# Patient Record
Sex: FEMALE | Race: OTHER | Employment: PART TIME | URBAN - METROPOLITAN AREA
[De-identification: names, ages, dates, MRNs, and addresses within clinical notes are randomized per-mention and may not be internally consistent; named-entity substitution may affect disease eponyms.]

---

## 2017-01-12 ENCOUNTER — OFFICE VISIT (OUTPATIENT)
Dept: NEUROLOGY | Facility: CLINIC | Age: 27
End: 2017-01-12

## 2017-01-12 DIAGNOSIS — Z01.30 BLOOD PRESSURE CHECK: Primary | ICD-10-CM

## 2017-01-26 ENCOUNTER — TELEPHONE (OUTPATIENT)
Dept: NEUROLOGY | Facility: CLINIC | Age: 27
End: 2017-01-26

## 2017-01-26 ENCOUNTER — OFFICE VISIT (OUTPATIENT)
Dept: NEUROLOGY | Facility: CLINIC | Age: 27
End: 2017-01-26

## 2017-01-26 VITALS
WEIGHT: 164.5 LBS | RESPIRATION RATE: 16 BRPM | BODY MASS INDEX: 30.66 KG/M2 | HEART RATE: 99 BPM | HEIGHT: 61.5 IN | SYSTOLIC BLOOD PRESSURE: 125 MMHG | DIASTOLIC BLOOD PRESSURE: 83 MMHG

## 2017-01-26 DIAGNOSIS — R51.9 CHRONIC DAILY HEADACHE: Primary | ICD-10-CM

## 2017-01-26 DIAGNOSIS — G43.019 INTRACTABLE MIGRAINE WITHOUT AURA AND WITHOUT STATUS MIGRAINOSUS: ICD-10-CM

## 2017-01-26 DIAGNOSIS — Z3A.19 19 WEEKS GESTATION OF PREGNANCY: ICD-10-CM

## 2017-01-26 PROCEDURE — 99244 OFF/OP CNSLTJ NEW/EST MOD 40: CPT | Performed by: OTHER

## 2017-01-26 RX ORDER — PROPRANOLOL HYDROCHLORIDE 40 MG/1
40 TABLET ORAL 2 TIMES DAILY
Qty: 60 TABLET | Refills: 0 | Status: SHIPPED | OUTPATIENT
Start: 2017-01-26 | End: 2017-02-22

## 2017-01-26 RX ORDER — SUMATRIPTAN 50 MG/1
50 TABLET, FILM COATED ORAL EVERY 2 HOUR PRN
Qty: 9 TABLET | Refills: 0 | Status: SHIPPED | OUTPATIENT
Start: 2017-01-26 | End: 2017-02-25

## 2017-01-26 RX ORDER — VERAPAMIL HYDROCHLORIDE 40 MG/1
40 TABLET ORAL 3 TIMES DAILY
Qty: 90 TABLET | Refills: 0 | Status: SHIPPED | OUTPATIENT
Start: 2017-01-26 | End: 2017-01-26

## 2017-01-26 NOTE — TELEPHONE ENCOUNTER
OB was agreeable to the use of propanolol. Patient would like prescription sent today if possible.   Use the [facility] on file

## 2017-01-26 NOTE — H&P
[facility] New Patient / Consult Visit    Alexia Vieyra is a 27year old female.                          Referring MD: Ramsey Penn    Patient presents with:  Migraine: C/O of getting between 4-8 migraines a month      HPI:    Wayne Tavarez improvement and weaned herself off this medication 1-2 weeks ago. .      In remote past, she tried Treximet 1 time when she was at work (when not pregnant) and she had an episode where she had severe headaches, with hot sensation and noted worsening \"hot s Paternal Grandmother    • Kidney Disease Paternal Grandmother    • Diabetes Maternal Grandmother    • Hypertension Maternal Grandmother    • Depression Maternal Grandmother    • lower back pain [Other] [OTHER] Maternal Grandmother        Allergies:  No Kno normal accomodation   Visual acuity: Normal              Visual fields: Normal  Oculomotor/Trochlear/Abducens:    Eye Movements: EOMI without nystagmus  Trigeminal:   Facial sensation:intact to light touch bilaterally  Facial:   Smile symmetric, eyebrow ra options are limited, as patient is 23 weeks gestational age. I suspect there is some hormonal component, contributing to the worsening of her recent headaches.   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       Suezanne Fothergill, MD, Neurology  [facility]  Pager [phone]  1/26/2017

## 2017-01-26 NOTE — TELEPHONE ENCOUNTER
Per Dr. Baltazar Mario: Can we try propanolol, low dose? Patient informed and will check with her OB.

## 2017-01-26 NOTE — TELEPHONE ENCOUNTER
Unknown Repine checked with her OB and reports that they, along with her Maternal Fetal Medicine physicians, are concerned that her BP may run too low and do not wish for her to initiate therapy.     She wants to alert Dr. Muna Sandoval and request any other recommendatio

## 2017-01-26 NOTE — TELEPHONE ENCOUNTER
Rx sent propranolol 40 mg bid; disp 60 - will assess tolerance before ordering refills; discontinued verapamil in system

## 2017-01-26 NOTE — PATIENT INSTRUCTIONS
Discuss with OB a trial of verapamil 40 mg tid  For prevention    take sumatriptan 50 mg (Imitrex) within first 30 minutes of symptoms starting; if not improved after 2 hours, take additional dose, and then stop taking; monitor for chest pain / tightness; insurer. Depending on your insurance carrier, approval may take 3-10 days. It is highly recommended patients contact their insurance carrier directly to determine coverage.   If test is done without insurance authorization, patient may be responsible for t

## 2017-02-22 DIAGNOSIS — R51.9 CHRONIC DAILY HEADACHE: Primary | ICD-10-CM

## 2017-02-22 DIAGNOSIS — G43.019 INTRACTABLE MIGRAINE WITHOUT AURA AND WITHOUT STATUS MIGRAINOSUS: ICD-10-CM

## 2017-02-22 RX ORDER — PROPRANOLOL HYDROCHLORIDE 40 MG/1
40 TABLET ORAL 2 TIMES DAILY
Qty: 60 TABLET | Refills: 2 | Status: SHIPPED | OUTPATIENT
Start: 2017-02-22 | End: 2017-03-17

## 2017-02-22 NOTE — TELEPHONE ENCOUNTER
Received refill request for Propranolol 40 mg twice daily.   Per TE encounter:    Rx sent propranolol 40 mg bid; disp 60 - will assess tolerance before ordering refills; discontinued verapamil in system                Called and left message for patient reg

## 2017-02-22 NOTE — TELEPHONE ENCOUNTER
Medication: Propranolol     Date of last refill: 1/26/2017  [place]    Last office visit: 1/26/2017  Due back to clinic per last office note:  RTC in 3 months  Date next office visit scheduled:  No

## 2017-02-22 NOTE — TELEPHONE ENCOUNTER
Patient returned phone call. Is doing well on current medication. No dizziness or side effects. Patient instructed to follow up in April. Voiced understanding.

## 2017-02-27 RX ORDER — AMITRIPTYLINE HYDROCHLORIDE 10 MG/1
TABLET, FILM COATED ORAL
Qty: 60 TABLET | Refills: 1 | OUTPATIENT
Start: 2017-02-27

## 2017-03-17 DIAGNOSIS — G43.019 INTRACTABLE MIGRAINE WITHOUT AURA AND WITHOUT STATUS MIGRAINOSUS: ICD-10-CM

## 2017-03-17 DIAGNOSIS — R51.9 CHRONIC DAILY HEADACHE: Primary | ICD-10-CM

## 2017-03-17 RX ORDER — PROPRANOLOL HYDROCHLORIDE 40 MG/1
40 TABLET ORAL 2 TIMES DAILY
Qty: 180 TABLET | Refills: 0 | Status: SHIPPED | OUTPATIENT
Start: 2017-03-17 | End: 2017-07-11

## 2017-03-17 NOTE — TELEPHONE ENCOUNTER
Medication: propranolol (90 day supply)    Date of last refill: 2/22/17 (30 day + 2 refills)  [place]    Last office visit: 1/26/17  Due back to clinic per last office note:  3 months  Date next office visit scheduled

## 2017-03-24 PROCEDURE — 82950 GLUCOSE TEST: CPT | Performed by: OBSTETRICS & GYNECOLOGY

## 2017-05-25 PROCEDURE — 87081 CULTURE SCREEN ONLY: CPT | Performed by: OBSTETRICS & GYNECOLOGY

## 2017-05-30 ENCOUNTER — HOSPITAL ENCOUNTER (INPATIENT)
Facility: HOSPITAL | Age: 27
LOS: 2 days | Discharge: HOME | End: 2017-06-01
Attending: OBSTETRICS & GYNECOLOGY | Admitting: OBSTETRICS & GYNECOLOGY
Payer: COMMERCIAL

## 2017-05-30 PROBLEM — Z34.90 PREGNANCY (HCC): Status: ACTIVE | Noted: 2017-05-30

## 2017-05-30 PROBLEM — Z34.90 PREGNANCY: Status: ACTIVE | Noted: 2017-05-30

## 2017-05-30 PROCEDURE — 86901 BLOOD TYPING SEROLOGIC RH(D): CPT | Performed by: OBSTETRICS & GYNECOLOGY

## 2017-05-30 PROCEDURE — 86850 RBC ANTIBODY SCREEN: CPT | Performed by: OBSTETRICS & GYNECOLOGY

## 2017-05-30 PROCEDURE — 86780 TREPONEMA PALLIDUM: CPT | Performed by: OBSTETRICS & GYNECOLOGY

## 2017-05-30 PROCEDURE — 85027 COMPLETE CBC AUTOMATED: CPT | Performed by: OBSTETRICS & GYNECOLOGY

## 2017-05-30 PROCEDURE — 86900 BLOOD TYPING SEROLOGIC ABO: CPT | Performed by: OBSTETRICS & GYNECOLOGY

## 2017-05-30 RX ORDER — IBUPROFEN 600 MG/1
600 TABLET ORAL ONCE AS NEEDED
Status: DISCONTINUED | OUTPATIENT
Start: 2017-05-30 | End: 2017-06-01

## 2017-05-30 RX ORDER — TERBUTALINE SULFATE 1 MG/ML
0.25 INJECTION, SOLUTION SUBCUTANEOUS AS NEEDED
Status: DISCONTINUED | OUTPATIENT
Start: 2017-05-30 | End: 2017-06-01

## 2017-05-30 RX ORDER — OXYTOCIN 10 [USP'U]/ML
INJECTION, SOLUTION INTRAMUSCULAR; INTRAVENOUS
Status: COMPLETED
Start: 2017-05-30 | End: 2017-05-30

## 2017-05-30 RX ORDER — PROPRANOLOL HYDROCHLORIDE 40 MG/1
40 TABLET ORAL
Status: DISCONTINUED | OUTPATIENT
Start: 2017-05-30 | End: 2017-06-01

## 2017-05-31 PROCEDURE — 85025 COMPLETE CBC W/AUTO DIFF WBC: CPT | Performed by: OBSTETRICS & GYNECOLOGY

## 2017-05-31 RX ORDER — IBUPROFEN 600 MG/1
600 TABLET ORAL EVERY 6 HOURS
Status: DISCONTINUED | OUTPATIENT
Start: 2017-05-31 | End: 2017-06-01

## 2017-05-31 RX ORDER — HYDROCODONE BITARTRATE AND ACETAMINOPHEN 5; 325 MG/1; MG/1
2 TABLET ORAL EVERY 4 HOURS PRN
Status: DISCONTINUED | OUTPATIENT
Start: 2017-05-31 | End: 2017-06-01

## 2017-05-31 RX ORDER — ACETAMINOPHEN 325 MG/1
650 TABLET ORAL EVERY 4 HOURS PRN
Status: DISCONTINUED | OUTPATIENT
Start: 2017-05-31 | End: 2017-06-01

## 2017-05-31 RX ORDER — ZOLPIDEM TARTRATE 5 MG/1
5 TABLET ORAL NIGHTLY PRN
Status: DISCONTINUED | OUTPATIENT
Start: 2017-05-31 | End: 2017-06-01

## 2017-05-31 RX ORDER — SIMETHICONE 80 MG
80 TABLET,CHEWABLE ORAL 3 TIMES DAILY PRN
Status: DISCONTINUED | OUTPATIENT
Start: 2017-05-31 | End: 2017-06-01

## 2017-05-31 RX ORDER — HYDROCODONE BITARTRATE AND ACETAMINOPHEN 5; 325 MG/1; MG/1
1 TABLET ORAL EVERY 4 HOURS PRN
Status: DISCONTINUED | OUTPATIENT
Start: 2017-05-31 | End: 2017-06-01

## 2017-05-31 RX ORDER — BISACODYL 10 MG
10 SUPPOSITORY, RECTAL RECTAL ONCE AS NEEDED
Status: ACTIVE | OUTPATIENT
Start: 2017-05-31 | End: 2017-05-31

## 2017-05-31 RX ORDER — DOCUSATE SODIUM 100 MG/1
100 CAPSULE, LIQUID FILLED ORAL
Status: DISCONTINUED | OUTPATIENT
Start: 2017-05-31 | End: 2017-06-01

## 2017-05-31 NOTE — PROGRESS NOTES
Pd #1  pT WITHOUT COMPLAINTS -   /74 mmHg  Pulse 61  Temp(Src) 98.7 °F (37.1 °C) (Oral)  Resp 16  Ht 5' 0.98\" (1.549 m)  Wt 184 lb 3.2 oz (83.553 kg)  BMI 34.82 kg/m2  LMP 09/15/2016  Breastfeeding?  Yes   Fundus - firm at U  Lochia - appropriate  Ex

## 2017-05-31 NOTE — PROGRESS NOTES
NURSING ADMISSION NOTE      Patient admitted via Wheelchair  Oriented to room. Safety precautions initiated. Bed in low position. Call light in reach. Pt admitted to room 1113 with spouse and infant at bedside.  ID tags verified, security bands in

## 2017-05-31 NOTE — PROGRESS NOTES
Pt is a 27year old female admitted to 114/114-A. Patient presents with:  Laboring: Patient c/o contractions every 5-7 minutes at 2030. Patient states had some spotting at 2100.  Patient SROM upon arrival to hospital.  Uncomfortable with contractions, mov

## 2017-05-31 NOTE — H&P
26 YO  with an LAZARO of 2017, 36w 5d EGA admitted due to advanced active labor. Called as Level 3 physician to attend delivery. Onset of contractions early this morning.  More regular and intense around 6 pm. SROM as pt arrived at hospital. Br Chromic with local anesthesia.  cc. IMP:  36 week + pregnancy with precipitous active phase of labor. PLAN:   Discussed potential for infant to transition in NICU due to  gestation and any respiratory, temperature, or glucose issue.  Ot

## 2017-05-31 NOTE — H&P
[address] Patient Status:  Inpatient    1990 MRN [id number]   [facility] 1NW-A Attending Reji Us DO   Hosp Day # 0 PCP Nazia Dean DO     SUBJECTIVE:    Elio Henriquez is a 27year old total) by mouth 2 (two) times daily. Disp: 180 tablet Rfl: 0 Taking   Clindamycin Phos-Benzoyl Perox 1-5 % External Gel Apply to face and chest twice daily. Disp: 50 g Rfl: 3 Taking   Prenatal Multivit-Min-Fe-FA (PRENATAL OR) Take by mouth.  Disp:  Rfl:  Ta

## 2017-05-31 NOTE — PROGRESS NOTES
Patient transferred to mother/baby via wheelchair with  in arms. Stable X 2, accompanied by spouse and belongings. Report given to Baljinder Valladares RN.

## 2017-06-01 VITALS
RESPIRATION RATE: 16 BRPM | HEIGHT: 60.98 IN | HEART RATE: 50 BPM | WEIGHT: 184.19 LBS | BODY MASS INDEX: 34.78 KG/M2 | TEMPERATURE: 98 F | DIASTOLIC BLOOD PRESSURE: 71 MMHG | SYSTOLIC BLOOD PRESSURE: 107 MMHG

## 2017-06-01 RX ORDER — HYDROCODONE BITARTRATE AND ACETAMINOPHEN 5; 325 MG/1; MG/1
1-2 TABLET ORAL EVERY 4 HOURS PRN
Qty: 30 TABLET | Refills: 0 | Status: SHIPPED | OUTPATIENT
Start: 2017-06-01 | End: 2017-07-11

## 2017-06-01 RX ORDER — IBUPROFEN 600 MG/1
600 TABLET ORAL EVERY 6 HOURS PRN
Qty: 60 TABLET | Refills: 0 | Status: SHIPPED | OUTPATIENT
Start: 2017-06-01 | End: 2017-07-11

## 2017-06-04 ENCOUNTER — TELEPHONE (OUTPATIENT)
Dept: OBGYN UNIT | Facility: HOSPITAL | Age: 27
End: 2017-06-04

## 2017-06-05 ENCOUNTER — TELEPHONE (OUTPATIENT)
Dept: OBGYN UNIT | Facility: HOSPITAL | Age: 27
End: 2017-06-05

## 2017-06-06 ENCOUNTER — NURSE ONLY (OUTPATIENT)
Dept: LACTATION | Facility: HOSPITAL | Age: 27
End: 2017-06-06
Attending: OBSTETRICS & GYNECOLOGY
Payer: COMMERCIAL

## 2017-06-06 VITALS — TEMPERATURE: 99 F

## 2017-06-06 DIAGNOSIS — O92.5 SUPPRESSED LACTATION WITH INFANT BREAST ATTACHMENT DIFFICULTY: Primary | ICD-10-CM

## 2017-06-06 DIAGNOSIS — O92.79 PAINFUL LACTATION: ICD-10-CM

## 2017-06-06 PROCEDURE — 99214 OFFICE O/P EST MOD 30 MIN: CPT

## 2017-06-06 NOTE — PATIENT INSTRUCTIONS
Kristy's naked weight is 5lbs 5.5oz. She transfers 54ml from both breasts and is satisfied. Some pinching noted despite manual flaring of lips and nipples compressed post feed.  Based on this assessment, it is recommended to continue to breastfeed on demelena doctor about taking ibuprofen for relief of swelling and discomfort. Call your [facility] doctor with any signs of mastitis (breast infection)  · Plugged area(s) are not soft within 24 hours. · Breast becomes firm, reddened, or painful.    · Fever, chills, or

## 2017-06-10 NOTE — PROGRESS NOTES
OB Progress Note PPD#2  S: Feels well. Ambulating, eating. Pain controlled. Minimal VB. Breastfeeding. O:   Blood pressure 118/72, pulse 58, temperature 98.1 °F (36.7 °C), temperature source Oral, resp.  rate 16, height 5' 0.98\" (1.549 m), weight 184 l

## 2017-06-12 ENCOUNTER — NURSE ONLY (OUTPATIENT)
Dept: LACTATION | Facility: HOSPITAL | Age: 27
End: 2017-06-12
Attending: OBSTETRICS & GYNECOLOGY
Payer: COMMERCIAL

## 2017-06-12 DIAGNOSIS — R52 PAIN AGGRAVATED BY BREAST FEEDING: Primary | ICD-10-CM

## 2017-06-12 DIAGNOSIS — O92.79 PAIN AGGRAVATED BY BREAST FEEDING: Primary | ICD-10-CM

## 2017-06-12 PROCEDURE — 99213 OFFICE O/P EST LOW 20 MIN: CPT

## 2017-06-12 NOTE — PATIENT INSTRUCTIONS
Kristy's naked weight is 5lbs 13.8oz, an increase of 8.3oz since last visit, 6 days ago. Radhajason Ahuja is still experiencing soreness with breastfeeding, left nipple abrasion has healed and nipples are intact but sore.  Kristy latches to left breast and transfers Place the shield, centered over the nipple and flip the shield right side out, drawing your nipple and areola into the shield as much as possible. • Massage breasts and if possible, hand express  some breastmilk into the nipple shield.      Latching your b to satisfaction. After feeding your baby:  • Pump your breasts for 10-15 minutes using a hospital grade rental breast pump, after most daytime feedings. This may help maintain adequate milk supply while using the shield.  If your baby is not

## 2017-07-11 PROCEDURE — 87660 TRICHOMONAS VAGIN DIR PROBE: CPT | Performed by: OBSTETRICS & GYNECOLOGY

## 2017-07-11 PROCEDURE — 87480 CANDIDA DNA DIR PROBE: CPT | Performed by: OBSTETRICS & GYNECOLOGY

## 2017-07-11 PROCEDURE — 87510 GARDNER VAG DNA DIR PROBE: CPT | Performed by: OBSTETRICS & GYNECOLOGY

## 2017-07-14 ENCOUNTER — NURSE ONLY (OUTPATIENT)
Dept: LACTATION | Facility: HOSPITAL | Age: 27
End: 2017-07-14
Attending: OBSTETRICS & GYNECOLOGY
Payer: COMMERCIAL

## 2017-07-14 DIAGNOSIS — R52 PAIN AGGRAVATED BY BREAST FEEDING: Primary | ICD-10-CM

## 2017-07-14 DIAGNOSIS — O92.79 PAIN AGGRAVATED BY BREAST FEEDING: Primary | ICD-10-CM

## 2017-07-14 PROCEDURE — 99214 OFFICE O/P EST MOD 30 MIN: CPT

## 2017-07-14 NOTE — PATIENT INSTRUCTIONS
Christophe Amaya has had a tongue tie/upper lip tie revision 2 weeks ago. Yessi Hanson now breastfeeds exclusively (bottle feeds intermittently while resting or out and about), using nipple shield mostly for pain and/or latch.  Today Ferguson latches to left breast in cross

## 2017-07-28 ENCOUNTER — APPOINTMENT (OUTPATIENT)
Dept: LAB | Age: 27
End: 2017-07-28
Attending: OBSTETRICS & GYNECOLOGY
Payer: COMMERCIAL

## 2017-07-28 LAB — HCG QUANTITATIVE: <1 MIU/ML (ref ?–1)

## 2017-07-28 PROCEDURE — 84702 CHORIONIC GONADOTROPIN TEST: CPT

## 2017-07-28 PROCEDURE — 36415 COLL VENOUS BLD VENIPUNCTURE: CPT

## 2017-08-02 ENCOUNTER — NURSE ONLY (OUTPATIENT)
Dept: LACTATION | Facility: HOSPITAL | Age: 27
End: 2017-08-02
Attending: OBSTETRICS & GYNECOLOGY
Payer: COMMERCIAL

## 2017-08-02 DIAGNOSIS — Z91.89 BREASTFEEDING PROBLEM: Primary | ICD-10-CM

## 2017-08-02 PROCEDURE — 99213 OFFICE O/P EST LOW 20 MIN: CPT

## 2017-08-14 DIAGNOSIS — R51.9 CHRONIC DAILY HEADACHE: ICD-10-CM

## 2017-08-14 DIAGNOSIS — G43.019 INTRACTABLE MIGRAINE WITHOUT AURA AND WITHOUT STATUS MIGRAINOSUS: ICD-10-CM

## 2017-08-14 RX ORDER — PROPRANOLOL HYDROCHLORIDE 40 MG/1
40 TABLET ORAL 2 TIMES DAILY
Qty: 180 TABLET | Refills: 3 | Status: SHIPPED | OUTPATIENT
Start: 2017-08-14 | End: 2018-03-29

## 2018-01-30 RX ORDER — CLINDAMYCIN AND BENZOYL PEROXIDE 10; 50 MG/G; MG/G
GEL TOPICAL
Qty: 50 G | Refills: 3 | Status: SHIPPED | OUTPATIENT
Start: 2018-01-30 | End: 2019-08-21

## 2018-01-30 NOTE — TELEPHONE ENCOUNTER
From: Gricelda Ward  Sent: 1/30/2018 3:05 PM CST  Subject: Medication Renewal Request    Gricelda Harrietgreg would like a refill of the following medications:     Clindamycin Phos-Benzoyl Perox 1-5 % External Gel [Kelsey Sanchez NP]    Preferred pharmacy

## 2018-02-16 ENCOUNTER — APPOINTMENT (OUTPATIENT)
Dept: GENERAL RADIOLOGY | Age: 28
End: 2018-02-16
Attending: EMERGENCY MEDICINE
Payer: COMMERCIAL

## 2018-02-16 ENCOUNTER — HOSPITAL ENCOUNTER (EMERGENCY)
Age: 28
Discharge: HOME | End: 2018-02-16
Attending: EMERGENCY MEDICINE
Payer: COMMERCIAL

## 2018-02-16 VITALS
RESPIRATION RATE: 18 BRPM | BODY MASS INDEX: 31 KG/M2 | TEMPERATURE: 98 F | WEIGHT: 166 LBS | HEART RATE: 85 BPM | OXYGEN SATURATION: 99 % | DIASTOLIC BLOOD PRESSURE: 68 MMHG | SYSTOLIC BLOOD PRESSURE: 128 MMHG

## 2018-02-16 DIAGNOSIS — S16.1XXA STRAIN OF NECK MUSCLE, INITIAL ENCOUNTER: ICD-10-CM
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
## 2018-02-16 DIAGNOSIS — S46.911A STRAIN OF RIGHT SHOULDER, INITIAL ENCOUNTER: ICD-10-CM

## 2018-02-16 PROCEDURE — 99284 EMERGENCY DEPT VISIT MOD MDM: CPT

## 2018-02-16 PROCEDURE — 72040 X-RAY EXAM NECK SPINE 2-3 VW: CPT | Performed by: EMERGENCY MEDICINE

## 2018-02-16 PROCEDURE — 73020 X-RAY EXAM OF SHOULDER: CPT | Performed by: EMERGENCY MEDICINE

## 2018-02-16 RX ORDER — IBUPROFEN 600 MG/1
600 TABLET ORAL ONCE
Status: COMPLETED | OUTPATIENT
Start: 2018-02-16 | End: 2018-02-16

## 2018-02-16 RX ORDER — CYCLOBENZAPRINE HCL 10 MG
10 TABLET ORAL 3 TIMES DAILY PRN
Qty: 20 TABLET | Refills: 0 | Status: SHIPPED | OUTPATIENT
Start: 2018-02-16 | End: 2018-02-23
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
## 2018-03-01 ENCOUNTER — OFFICE VISIT (OUTPATIENT)
Dept: FAMILY MEDICINE CLINIC | Facility: CLINIC | Age: 28
End: 2018-03-01

## 2018-03-01 ENCOUNTER — HOSPITAL ENCOUNTER (OUTPATIENT)
Dept: GENERAL RADIOLOGY | Age: 28
Discharge: HOME | End: 2018-03-01
Attending: FAMILY MEDICINE
Payer: COMMERCIAL

## 2018-03-01 VITALS
TEMPERATURE: 99 F | SYSTOLIC BLOOD PRESSURE: 100 MMHG | DIASTOLIC BLOOD PRESSURE: 60 MMHG | HEART RATE: 105 BPM | WEIGHT: 167 LBS | HEIGHT: 61.5 IN | BODY MASS INDEX: 31.13 KG/M2 | OXYGEN SATURATION: 98 % | RESPIRATION RATE: 20 BRPM
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
## 2018-03-01 DIAGNOSIS — R20.2 PARESTHESIA: ICD-10-CM

## 2018-03-01 DIAGNOSIS — M54.2 NECK PAIN: ICD-10-CM

## 2018-03-01 DIAGNOSIS — M54.2 NECK PAIN: Primary | ICD-10-CM

## 2018-03-01 PROCEDURE — 99214 OFFICE O/P EST MOD 30 MIN: CPT | Performed by: FAMILY MEDICINE

## 2018-03-01 RX ORDER — NAPROXEN 500 MG/1
500 TABLET ORAL 2 TIMES DAILY WITH MEALS
Qty: 60 TABLET | Refills: 0 | Status: SHIPPED | OUTPATIENT
Start: 2018-03-01 | End: 2018-06-20
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
## 2018-03-07 ENCOUNTER — OFFICE VISIT (OUTPATIENT)
Dept: PHYSICAL THERAPY | Age: 28
End: 2018-03-07
Attending: FAMILY MEDICINE
Payer: COMMERCIAL

## 2018-03-07 DIAGNOSIS — M54.2 NECK PAIN: ICD-10-CM
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
## 2018-03-07 DIAGNOSIS — R20.2 PARESTHESIA: ICD-10-CM

## 2018-03-07 PROCEDURE — 97162 PT EVAL MOD COMPLEX 30 MIN: CPT

## 2018-03-07 PROCEDURE — 97110 THERAPEUTIC EXERCISES: CPT
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
## 2018-03-13 ENCOUNTER — OFFICE VISIT (OUTPATIENT)
Dept: PHYSICAL THERAPY | Age: 28
End: 2018-03-13
Attending: FAMILY MEDICINE
Payer: COMMERCIAL

## 2018-03-13 DIAGNOSIS — M54.2 NECK PAIN: ICD-10-CM
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
## 2018-03-13 DIAGNOSIS — R20.2 PARESTHESIA: ICD-10-CM

## 2018-03-13 PROCEDURE — 97110 THERAPEUTIC EXERCISES: CPT

## 2018-03-13 PROCEDURE — 97112 NEUROMUSCULAR REEDUCATION: CPT

## 2018-03-13 PROCEDURE — 97140 MANUAL THERAPY 1/> REGIONS: CPT
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
## 2018-03-15 ENCOUNTER — OFFICE VISIT (OUTPATIENT)
Dept: PHYSICAL THERAPY | Age: 28
End: 2018-03-15
Attending: FAMILY MEDICINE
Payer: COMMERCIAL

## 2018-03-15 DIAGNOSIS — R20.2 PARESTHESIA: ICD-10-CM
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
## 2018-03-15 DIAGNOSIS — M54.2 NECK PAIN: ICD-10-CM

## 2018-03-15 PROCEDURE — 97112 NEUROMUSCULAR REEDUCATION: CPT

## 2018-03-15 PROCEDURE — 97140 MANUAL THERAPY 1/> REGIONS: CPT

## 2018-03-15 PROCEDURE — 97110 THERAPEUTIC EXERCISES: CPT

## 2018-03-19 ENCOUNTER — OFFICE VISIT (OUTPATIENT)
Dept: PHYSICAL THERAPY | Age: 28
End: 2018-03-19
Attending: FAMILY MEDICINE
Payer: COMMERCIAL

## 2018-03-19 DIAGNOSIS — R20.2 PARESTHESIA: ICD-10-CM

## 2018-03-19 DIAGNOSIS — M54.2 NECK PAIN: ICD-10-CM
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
## 2018-03-19 PROCEDURE — 97112 NEUROMUSCULAR REEDUCATION: CPT

## 2018-03-19 PROCEDURE — 97110 THERAPEUTIC EXERCISES: CPT

## 2018-03-19 PROCEDURE — 97140 MANUAL THERAPY 1/> REGIONS: CPT
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
## 2018-03-21 ENCOUNTER — OFFICE VISIT (OUTPATIENT)
Dept: PHYSICAL THERAPY | Age: 28
End: 2018-03-21
Attending: FAMILY MEDICINE
Payer: COMMERCIAL
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
## 2018-03-21 DIAGNOSIS — M54.2 NECK PAIN: ICD-10-CM

## 2018-03-21 DIAGNOSIS — R20.2 PARESTHESIA: ICD-10-CM

## 2018-03-21 PROCEDURE — 97140 MANUAL THERAPY 1/> REGIONS: CPT

## 2018-03-21 PROCEDURE — 97112 NEUROMUSCULAR REEDUCATION: CPT

## 2018-03-21 PROCEDURE — 97110 THERAPEUTIC EXERCISES: CPT

## 2018-03-26 ENCOUNTER — OFFICE VISIT (OUTPATIENT)
Dept: PHYSICAL THERAPY | Age: 28
End: 2018-03-26
Attending: FAMILY MEDICINE
Payer: COMMERCIAL
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
## 2018-03-26 DIAGNOSIS — R20.2 PARESTHESIA: ICD-10-CM

## 2018-03-26 DIAGNOSIS — M54.2 NECK PAIN: ICD-10-CM

## 2018-03-26 PROCEDURE — 97140 MANUAL THERAPY 1/> REGIONS: CPT

## 2018-03-26 PROCEDURE — 97110 THERAPEUTIC EXERCISES: CPT

## 2018-03-26 PROCEDURE — 97112 NEUROMUSCULAR REEDUCATION: CPT
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
## 2018-03-28 ENCOUNTER — TELEPHONE (OUTPATIENT)
Dept: PHYSICAL THERAPY | Age: 28
End: 2018-03-28

## 2018-03-28 ENCOUNTER — APPOINTMENT (OUTPATIENT)
Dept: PHYSICAL THERAPY | Age: 28
End: 2018-03-28
Attending: FAMILY MEDICINE
Payer: COMMERCIAL

## 2018-03-29 ENCOUNTER — LAB ENCOUNTER (OUTPATIENT)
Dept: LAB | Age: 28
End: 2018-03-29
Attending: FAMILY MEDICINE
Payer: COMMERCIAL

## 2018-03-29 ENCOUNTER — OFFICE VISIT (OUTPATIENT)
Dept: FAMILY MEDICINE CLINIC | Facility: CLINIC | Age: 28
End: 2018-03-29

## 2018-03-29 VITALS
RESPIRATION RATE: 20 BRPM | OXYGEN SATURATION: 96 % | WEIGHT: 168 LBS | DIASTOLIC BLOOD PRESSURE: 68 MMHG | BODY MASS INDEX: 31.31 KG/M2 | HEART RATE: 86 BPM | HEIGHT: 61.5 IN | SYSTOLIC BLOOD PRESSURE: 112 MMHG | TEMPERATURE: 100 F

## 2018-03-29 DIAGNOSIS — Z00.00 ANNUAL PHYSICAL EXAM: ICD-10-CM

## 2018-03-29 DIAGNOSIS — Z00.00 ANNUAL PHYSICAL EXAM: Primary | ICD-10-CM

## 2018-03-29 LAB
25-HYDROXYVITAMIN D (TOTAL): 22.2 NG/ML (ref 30–100)
ALBUMIN SERPL-MCNC: 4 G/DL (ref 3.5–4.8)
ALP LIVER SERPL-CCNC: 102 U/L (ref 37–98)
ALT SERPL-CCNC: 18 U/L (ref 14–54)
AST SERPL-CCNC: 15 U/L (ref 15–41)
BASOPHILS # BLD AUTO: 0.04 X10(3) UL (ref 0–0.1)
BASOPHILS NFR BLD AUTO: 0.6 %
BILIRUB SERPL-MCNC: 0.6 MG/DL (ref 0.1–2)
BUN BLD-MCNC: 12 MG/DL (ref 8–20)
CALCIUM BLD-MCNC: 9.1 MG/DL (ref 8.3–10.3)
CHLORIDE: 108 MMOL/L (ref 101–111)
CHOLEST SMN-MCNC: 158 MG/DL (ref ?–200)
CO2: 25 MMOL/L (ref 22–32)
CREAT BLD-MCNC: 0.66 MG/DL (ref 0.55–1.02)
EOSINOPHIL # BLD AUTO: 0.13 X10(3) UL (ref 0–0.3)
EOSINOPHIL NFR BLD AUTO: 1.9 %
ERYTHROCYTE [DISTWIDTH] IN BLOOD BY AUTOMATED COUNT: 13 % (ref 11.5–16)
FREE T4: 0.9 NG/DL (ref 0.9–1.8)
GLUCOSE BLD-MCNC: 91 MG/DL (ref 70–99)
HAV AB SERPL IA-ACNC: 755 PG/ML (ref 193–986)
HCT VFR BLD AUTO: 42.3 % (ref 34–50)
HDLC SERPL-MCNC: 60 MG/DL (ref 45–?)
HDLC SERPL: 2.63 {RATIO} (ref ?–4.44)
HGB BLD-MCNC: 13.8 G/DL (ref 12–16)
IMMATURE GRANULOCYTE COUNT: 0.01 X10(3) UL (ref 0–1)
IMMATURE GRANULOCYTE RATIO %: 0.1 %
LDLC SERPL CALC-MCNC: 85 MG/DL (ref ?–130)
LYMPHOCYTES # BLD AUTO: 2.05 X10(3) UL (ref 0.9–4)
LYMPHOCYTES NFR BLD AUTO: 29.7 %
M PROTEIN MFR SERPL ELPH: 7.6 G/DL (ref 6.1–8.3)
MCH RBC QN AUTO: 29.1 PG (ref 27–33.2)
MCHC RBC AUTO-ENTMCNC: 32.6 G/DL (ref 31–37)
MCV RBC AUTO: 89.1 FL (ref 81–100)
MONOCYTES # BLD AUTO: 0.44 X10(3) UL (ref 0.1–1)
MONOCYTES NFR BLD AUTO: 6.4 %
NEUTROPHIL ABS PRELIM: 4.24 X10 (3) UL (ref 1.3–6.7)
NEUTROPHILS # BLD AUTO: 4.24 X10(3) UL (ref 1.3–6.7)
NEUTROPHILS NFR BLD AUTO: 61.3 %
NONHDLC SERPL-MCNC: 98 MG/DL (ref ?–130)
PLATELET # BLD AUTO: 285 10(3)UL (ref 150–450)
POTASSIUM SERPL-SCNC: 4.1 MMOL/L (ref 3.6–5.1)
RBC # BLD AUTO: 4.75 X10(6)UL (ref 3.8–5.1)
RED CELL DISTRIBUTION WIDTH-SD: 42.5 FL (ref 35.1–46.3)
SODIUM SERPL-SCNC: 142 MMOL/L (ref 136–144)
TRIGL SERPL-MCNC: 66 MG/DL (ref ?–150)
TSI SER-ACNC: 1.47 MIU/ML (ref 0.35–5.5)
VLDLC SERPL CALC-MCNC: 13 MG/DL (ref 5–40)
WBC # BLD AUTO: 6.9 X10(3) UL (ref 4–13)

## 2018-03-29 PROCEDURE — 84439 ASSAY OF FREE THYROXINE: CPT | Performed by: FAMILY MEDICINE

## 2018-03-29 PROCEDURE — 82607 VITAMIN B-12: CPT | Performed by: FAMILY MEDICINE

## 2018-03-29 PROCEDURE — 99395 PREV VISIT EST AGE 18-39: CPT | Performed by: FAMILY MEDICINE

## 2018-03-29 PROCEDURE — 80061 LIPID PANEL: CPT | Performed by: FAMILY MEDICINE

## 2018-03-29 PROCEDURE — 82306 VITAMIN D 25 HYDROXY: CPT | Performed by: FAMILY MEDICINE

## 2018-03-29 PROCEDURE — 80050 GENERAL HEALTH PANEL: CPT | Performed by: FAMILY MEDICINE

## 2018-03-29 PROCEDURE — 36415 COLL VENOUS BLD VENIPUNCTURE: CPT | Performed by: FAMILY MEDICINE

## 2018-03-29 NOTE — PROGRESS NOTES
HPI:   Gely Sloan is a 32year old female who presents for a complete physical exam.       Wt Readings from Last 6 Encounters:  03/29/18 : 168 lb  03/01/18 : 167 lb  02/16/18 : 166 lb 0.1 oz  07/11/17 : 166 lb  05/30/17 : 184 lb 3.2 oz  05/25/17 : 184 l Paternal Grandmother    • Hypertension Paternal Grandmother    • Heart Disease Paternal Grandmother    • Kidney Disease Paternal Grandmother       Social History:   Smoking status: Never Smoker                                                              S presents for     1. Annual physical exam    - FREE T4 (FREE THYROXINE); Future  - ASSAY, THYROID STIM HORMONE; Future  - LIPID PANEL; Future  - CBC WITH DIFFERENTIAL WITH PLATELET;  Future  - VITAMIN B12; Future  - VITAMIN D, 25-HYDROXY; Future  - COMP META

## 2018-04-02 ENCOUNTER — TELEPHONE (OUTPATIENT)
Dept: PHYSICAL THERAPY | Age: 28
End: 2018-04-02

## 2018-04-02 ENCOUNTER — APPOINTMENT (OUTPATIENT)
Dept: PHYSICAL THERAPY | Age: 28
End: 2018-04-02
Attending: FAMILY MEDICINE
Payer: COMMERCIAL

## 2018-04-04 ENCOUNTER — OFFICE VISIT (OUTPATIENT)
Dept: PHYSICAL THERAPY | Age: 28
End: 2018-04-04
Attending: FAMILY MEDICINE
Payer: COMMERCIAL

## 2018-04-04 DIAGNOSIS — M54.2 NECK PAIN: ICD-10-CM

## 2018-04-04 DIAGNOSIS — R20.2 PARESTHESIA: ICD-10-CM
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
## 2018-04-04 PROCEDURE — 97140 MANUAL THERAPY 1/> REGIONS: CPT

## 2018-04-04 PROCEDURE — 97110 THERAPEUTIC EXERCISES: CPT

## 2018-04-04 PROCEDURE — 97112 NEUROMUSCULAR REEDUCATION: CPT
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
## 2018-04-17 ENCOUNTER — OFFICE VISIT (OUTPATIENT)
Dept: PHYSICAL THERAPY | Age: 28
End: 2018-04-17
Attending: FAMILY MEDICINE
Payer: COMMERCIAL

## 2018-04-17 PROCEDURE — 97110 THERAPEUTIC EXERCISES: CPT

## 2018-04-17 PROCEDURE — 97112 NEUROMUSCULAR REEDUCATION: CPT

## 2018-04-17 PROCEDURE — 97140 MANUAL THERAPY 1/> REGIONS: CPT
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
## 2018-04-24 ENCOUNTER — OFFICE VISIT (OUTPATIENT)
Dept: PHYSICAL THERAPY | Age: 28
End: 2018-04-24
Attending: FAMILY MEDICINE
Payer: COMMERCIAL

## 2018-04-24 PROCEDURE — 97112 NEUROMUSCULAR REEDUCATION: CPT

## 2018-04-24 PROCEDURE — 97140 MANUAL THERAPY 1/> REGIONS: CPT

## 2018-04-24 PROCEDURE — 97110 THERAPEUTIC EXERCISES: CPT
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
## 2018-05-08 ENCOUNTER — APPOINTMENT (OUTPATIENT)
Dept: PHYSICAL THERAPY | Age: 28
End: 2018-05-08
Payer: COMMERCIAL

## 2018-05-17 ENCOUNTER — OFFICE VISIT (OUTPATIENT)
Dept: PHYSICAL THERAPY | Age: 28
End: 2018-05-17
Attending: FAMILY MEDICINE
Payer: COMMERCIAL

## 2018-05-17 PROCEDURE — 97140 MANUAL THERAPY 1/> REGIONS: CPT

## 2018-05-17 PROCEDURE — 97112 NEUROMUSCULAR REEDUCATION: CPT

## 2018-05-17 PROCEDURE — 97110 THERAPEUTIC EXERCISES: CPT
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
## 2018-05-21 ENCOUNTER — APPOINTMENT (OUTPATIENT)
Dept: PHYSICAL THERAPY | Age: 28
End: 2018-05-21
Payer: COMMERCIAL

## 2018-05-31 ENCOUNTER — OFFICE VISIT (OUTPATIENT)
Dept: PHYSICAL THERAPY | Age: 28
End: 2018-05-31
Attending: FAMILY MEDICINE
Payer: COMMERCIAL

## 2018-05-31 PROCEDURE — 97140 MANUAL THERAPY 1/> REGIONS: CPT

## 2018-05-31 PROCEDURE — 97112 NEUROMUSCULAR REEDUCATION: CPT

## 2018-05-31 PROCEDURE — 97110 THERAPEUTIC EXERCISES: CPT
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
## 2018-06-20 PROCEDURE — 88175 CYTOPATH C/V AUTO FLUID REDO: CPT | Performed by: OBSTETRICS & GYNECOLOGY

## 2018-09-20 PROCEDURE — 87510 GARDNER VAG DNA DIR PROBE: CPT | Performed by: OBSTETRICS & GYNECOLOGY

## 2018-09-20 PROCEDURE — 87660 TRICHOMONAS VAGIN DIR PROBE: CPT | Performed by: OBSTETRICS & GYNECOLOGY

## 2018-09-20 PROCEDURE — 87480 CANDIDA DNA DIR PROBE: CPT | Performed by: OBSTETRICS & GYNECOLOGY

## 2018-11-10 RX ORDER — CEFDINIR 300 MG/1
300 CAPSULE ORAL 2 TIMES DAILY
Qty: 20 CAPSULE | Refills: 0 | Status: SHIPPED | OUTPATIENT
Start: 2018-11-10 | End: 2018-11-20

## 2019-02-26 ENCOUNTER — OFFICE VISIT (OUTPATIENT)
Dept: FAMILY MEDICINE CLINIC | Facility: CLINIC | Age: 29
End: 2019-02-26
Payer: COMMERCIAL

## 2019-02-26 VITALS
SYSTOLIC BLOOD PRESSURE: 112 MMHG | HEIGHT: 61 IN | WEIGHT: 148 LBS | HEART RATE: 112 BPM | DIASTOLIC BLOOD PRESSURE: 74 MMHG | TEMPERATURE: 99 F | OXYGEN SATURATION: 94 % | BODY MASS INDEX: 27.94 KG/M2 | RESPIRATION RATE: 16 BRPM

## 2019-02-26 DIAGNOSIS — J01.00 SUBACUTE MAXILLARY SINUSITIS: ICD-10-CM

## 2019-02-26 DIAGNOSIS — R10.9 ABDOMINAL PAIN, UNSPECIFIED ABDOMINAL LOCATION: Primary | ICD-10-CM

## 2019-02-26 LAB
APPEARANCE: CLEAR
BILIRUBIN: NEGATIVE
GLUCOSE (URINE DIPSTICK): NEGATIVE MG/DL
KETONES (URINE DIPSTICK): NEGATIVE MG/DL
MULTISTIX LOT#: NORMAL NUMERIC
NITRITE, URINE: NEGATIVE
OCCULT BLOOD: NEGATIVE
PH, URINE: 6.5 (ref 4.5–8)
PROTEIN (URINE DIPSTICK): NEGATIVE MG/DL
SPECIFIC GRAVITY: 1.01 (ref 1–1.03)
URINE-COLOR: YELLOW
UROBILINOGEN,SEMI-QN: 0.2 MG/DL (ref 0–1.9)

## 2019-02-26 PROCEDURE — 81003 URINALYSIS AUTO W/O SCOPE: CPT | Performed by: FAMILY MEDICINE

## 2019-02-26 PROCEDURE — 87086 URINE CULTURE/COLONY COUNT: CPT | Performed by: FAMILY MEDICINE

## 2019-02-26 PROCEDURE — 99213 OFFICE O/P EST LOW 20 MIN: CPT | Performed by: FAMILY MEDICINE

## 2019-02-26 RX ORDER — AMOXICILLIN AND CLAVULANATE POTASSIUM 875; 125 MG/1; MG/1
1 TABLET, FILM COATED ORAL 2 TIMES DAILY
Qty: 20 TABLET | Refills: 0 | Status: SHIPPED | OUTPATIENT
Start: 2019-02-26 | End: 2019-03-08

## 2019-02-26 RX ORDER — OMEPRAZOLE 40 MG/1
40 CAPSULE, DELAYED RELEASE ORAL DAILY
Qty: 30 CAPSULE | Refills: 2 | Status: SHIPPED | OUTPATIENT
Start: 2019-02-26 | End: 2019-04-16

## 2019-02-26 NOTE — PROGRESS NOTES
HPI:   Estephania Blandon is a 25year old female who presents with malaise    Pt has had a URI for 2 weeks   No fever  + c/c/sinus pressure     Pt reports on Sunday she has had left flank pain   + chills    Pt had some diet changes on sat - pt ate cheese ; pt Never Smoker      Smokeless tobacco: Never Used    Alcohol use: Yes      Alcohol/week: 0.0 oz      Comment: rare    Drug use: No    Occ: . : .  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 Due to neck pain   Diet: watches minimally     REVIEW OF SYSTEMS: 875-125 MG Oral Tab; Take 1 tablet by mouth 2 (two) times daily for 10 days. Dispense: 20 tablet; Refill: 0      Questions answered and patient indicates understanding of these issues and agrees to the plan.   RTC in 1 week or sooner if needed

## 2019-03-01 ENCOUNTER — TELEPHONE (OUTPATIENT)
Dept: FAMILY MEDICINE CLINIC | Facility: CLINIC | Age: 29
End: 2019-03-01

## 2019-03-01 RX ORDER — ONDANSETRON 4 MG/1
4 TABLET, ORALLY DISINTEGRATING ORAL EVERY 8 HOURS PRN
Qty: 20 TABLET | Refills: 0 | Status: SHIPPED | OUTPATIENT
Start: 2019-03-01 | End: 2019-12-02

## 2019-04-16 DIAGNOSIS — R10.9 ABDOMINAL PAIN, UNSPECIFIED ABDOMINAL LOCATION: ICD-10-CM

## 2019-04-16 RX ORDER — OMEPRAZOLE 40 MG/1
40 CAPSULE, DELAYED RELEASE ORAL DAILY
Qty: 30 CAPSULE | Refills: 2 | Status: SHIPPED | OUTPATIENT
Start: 2019-04-16 | End: 2019-10-30 | Stop reason: ALTCHOICE

## 2019-04-16 NOTE — TELEPHONE ENCOUNTER
Rx Request  Abdominal pain, unspecified abdominal location    Disp:     90               R: 0      Associated Dx: Abdominal pain, unspecified abdominal location    Last Visit: 02/26/2019    Last Refilled: 2/26/2019

## 2019-07-06 RX ORDER — DEXAMETHASONE 0.5 MG/5ML
ELIXIR ORAL
Qty: 237 ML | Refills: 0 | Status: SHIPPED | OUTPATIENT
Start: 2019-07-06 | End: 2020-01-08 | Stop reason: ALTCHOICE

## 2019-07-12 ENCOUNTER — OFFICE VISIT (OUTPATIENT)
Dept: FAMILY MEDICINE CLINIC | Facility: CLINIC | Age: 29
End: 2019-07-12
Payer: COMMERCIAL

## 2019-07-12 VITALS
DIASTOLIC BLOOD PRESSURE: 72 MMHG | WEIGHT: 157 LBS | OXYGEN SATURATION: 100 % | HEART RATE: 78 BPM | SYSTOLIC BLOOD PRESSURE: 128 MMHG | BODY MASS INDEX: 30 KG/M2 | RESPIRATION RATE: 18 BRPM | TEMPERATURE: 99 F

## 2019-07-12 DIAGNOSIS — L70.0 ACNE VULGARIS: Primary | ICD-10-CM

## 2019-07-12 PROCEDURE — 99212 OFFICE O/P EST SF 10 MIN: CPT | Performed by: PHYSICIAN ASSISTANT

## 2019-07-12 RX ORDER — SPIRONOLACTONE 25 MG/1
25 TABLET ORAL DAILY
Qty: 30 TABLET | Refills: 2 | Status: SHIPPED | OUTPATIENT
Start: 2019-07-12 | End: 2019-08-21 | Stop reason: ALTCHOICE

## 2019-07-12 RX ORDER — SODIUM SULFACETAMIDE 100 MG/ML
LIQUID TOPICAL
Qty: 340 ML | Refills: 2 | Status: SHIPPED | OUTPATIENT
Start: 2019-07-12 | End: 2019-07-19

## 2019-07-12 NOTE — PROGRESS NOTES
HPI:    Patient ID: Mery Amaya is a 25year old female. HPI   Patient presents today for follow-up of acne which is been an ongoing problem for many years.   In the past has tried various topical preparations including clindamycin-benzoyl peroxide and 0.08 % External Gel Apply 1 Application topically nightly. To face and chest Disp: 50 g Rfl: 4   Levonorgestrel (MIRENA, 52 MG,) 20 MCG/24HR Intrauterine IUD 1 each by Intrauterine route once.  Disp:  Rfl:    Sulfamethoxazole-TMP -160 MG Oral Tab per

## 2019-07-19 RX ORDER — SODIUM SULFACETAMIDE AND SULFUR 80; 40 MG/ML; MG/ML
SOLUTION TOPICAL
Qty: 473 ML | Refills: 2 | Status: SHIPPED | OUTPATIENT
Start: 2019-07-19 | End: 2019-08-22

## 2019-07-19 RX ORDER — SODIUM SULFACETAMIDE 100 MG/ML
LIQUID TOPICAL
Qty: 340 ML | Refills: 2 | Status: SHIPPED | OUTPATIENT
Start: 2019-07-19 | End: 2019-07-19 | Stop reason: CLARIF

## 2019-08-21 DIAGNOSIS — L70.0 ACNE VULGARIS: Primary | ICD-10-CM

## 2019-08-21 RX ORDER — SULFAMETHOXAZOLE AND TRIMETHOPRIM 800; 160 MG/1; MG/1
1 TABLET ORAL 2 TIMES DAILY
Qty: 60 TABLET | Refills: 2 | Status: SHIPPED | OUTPATIENT
Start: 2019-08-21 | End: 2019-09-26

## 2019-08-21 RX ORDER — CLINDAMYCIN AND BENZOYL PEROXIDE 10; 50 MG/G; MG/G
GEL TOPICAL
Qty: 50 G | Refills: 3 | Status: SHIPPED | OUTPATIENT
Start: 2019-08-21 | End: 2020-12-03

## 2019-08-21 NOTE — PROGRESS NOTES
Spoke with patient regarding symptoms. Has not had any relief on the spironolactone in the last 6 weeks. Acne is getting worse, she is having more inflammatory, painful nodules.  In the past she has responded well to combination of Retin-A, Clindamycin-johnie

## 2019-08-22 ENCOUNTER — TELEPHONE (OUTPATIENT)
Dept: FAMILY MEDICINE CLINIC | Facility: CLINIC | Age: 29
End: 2019-08-22

## 2019-08-22 RX ORDER — SODIUM SULFACETAMIDE AND SULFUR 80; 40 MG/ML; MG/ML
SOLUTION TOPICAL
Qty: 473 ML | Refills: 2 | Status: SHIPPED | OUTPATIENT
Start: 2019-08-22 | End: 2020-01-08 | Stop reason: ALTCHOICE

## 2019-08-22 NOTE — TELEPHONE ENCOUNTER
According to [address] from [address] the sulfacetamide refill request was faxed to the wrong # - needs to go to [address] fax #[phone].

## 2019-09-26 RX ORDER — SULFAMETHOXAZOLE AND TRIMETHOPRIM 800; 160 MG/1; MG/1
1 TABLET ORAL 2 TIMES DAILY
Qty: 180 TABLET | Refills: 1 | Status: SHIPPED | OUTPATIENT
Start: 2019-09-26 | End: 2020-05-08

## 2019-10-16 ENCOUNTER — NURSE ONLY (OUTPATIENT)
Dept: FAMILY MEDICINE CLINIC | Facility: CLINIC | Age: 29
End: 2019-10-16
Payer: COMMERCIAL

## 2019-10-16 DIAGNOSIS — R30.0 BURNING WITH URINATION: Primary | ICD-10-CM

## 2019-10-16 PROCEDURE — 87086 URINE CULTURE/COLONY COUNT: CPT | Performed by: FAMILY MEDICINE

## 2019-10-30 DIAGNOSIS — R10.9 ABDOMINAL PAIN, UNSPECIFIED ABDOMINAL LOCATION: ICD-10-CM

## 2019-10-30 RX ORDER — PANTOPRAZOLE SODIUM 40 MG/1
40 TABLET, DELAYED RELEASE ORAL
Qty: 90 TABLET | Refills: 0 | Status: SHIPPED | OUTPATIENT
Start: 2019-10-30 | End: 2020-01-24

## 2019-11-11 NOTE — PROGRESS NOTES
HPI:   Alexia Vieyra is a 30year old female who presents for a complete physical exam.       Wt Readings from Last 6 Encounters:  11/12/19 : 166 lb (75.3 kg)  07/12/19 : 157 lb (71.2 kg)  02/26/19 : 148 lb (67.1 kg)  09/20/18 : 151 lb (68.5 kg)  07/16/18 daily. 50 g 3   • Tretinoin Microsphere (RETIN-A MICRO PUMP) 0.08 % External Gel Apply 1 Application topically nightly. To face and chest 50 g 4   • Dexamethasone 0.5 MG/5ML Oral Elixir Take by mouth, swish and spit 1-2 times daily.  237 mL 0   • triamcinol lesions  EYES:denies blurred vision or double vision  HEENT: denies nasal congestion, sinus pain or ST  LUNGS: denies shortness of breath with exertion  CARDIOVASCULAR: denies chest pain on exertion  GI: denies abdominal pain,denies heartburn  : denies d exercise,calcium, vitamin D, fish oil and self breast exams. Questions answered and patient indicates understanding of these issues and agrees to the plan.   RTC in 1 month or sooner if needed

## 2019-11-12 ENCOUNTER — OFFICE VISIT (OUTPATIENT)
Dept: FAMILY MEDICINE CLINIC | Facility: CLINIC | Age: 29
End: 2019-11-12
Payer: COMMERCIAL

## 2019-11-12 VITALS
HEIGHT: 61.25 IN | DIASTOLIC BLOOD PRESSURE: 72 MMHG | HEART RATE: 70 BPM | WEIGHT: 166 LBS | RESPIRATION RATE: 18 BRPM | OXYGEN SATURATION: 98 % | BODY MASS INDEX: 30.94 KG/M2 | TEMPERATURE: 98 F | SYSTOLIC BLOOD PRESSURE: 116 MMHG

## 2019-11-12 DIAGNOSIS — E04.9 GOITER: ICD-10-CM

## 2019-11-12 DIAGNOSIS — Z00.00 ANNUAL PHYSICAL EXAM: Primary | ICD-10-CM

## 2019-11-12 PROCEDURE — 99395 PREV VISIT EST AGE 18-39: CPT | Performed by: FAMILY MEDICINE

## 2019-11-12 RX ORDER — BUPROPION HYDROCHLORIDE 150 MG/1
150 TABLET, EXTENDED RELEASE ORAL 2 TIMES DAILY
Qty: 60 TABLET | Refills: 0 | Status: SHIPPED | OUTPATIENT
Start: 2019-11-12 | End: 2019-12-05

## 2019-11-13 ENCOUNTER — TELEPHONE (OUTPATIENT)
Dept: FAMILY MEDICINE CLINIC | Facility: CLINIC | Age: 29
End: 2019-11-13

## 2019-11-14 RX ORDER — SPIRONOLACTONE 25 MG/1
TABLET ORAL
Qty: 30 TABLET | Refills: 2 | OUTPATIENT
Start: 2019-11-14

## 2019-11-14 NOTE — TELEPHONE ENCOUNTER
Please find out if she is still taking this medication. As far as I recall we stopped it and she is now on Bactrim.

## 2019-11-20 ENCOUNTER — LAB ENCOUNTER (OUTPATIENT)
Dept: LAB | Age: 29
End: 2019-11-20
Attending: FAMILY MEDICINE
Payer: COMMERCIAL

## 2019-11-20 DIAGNOSIS — Z00.00 ANNUAL PHYSICAL EXAM: ICD-10-CM

## 2019-11-20 DIAGNOSIS — L70.0 ACNE VULGARIS: ICD-10-CM

## 2019-11-20 PROCEDURE — 86376 MICROSOMAL ANTIBODY EACH: CPT | Performed by: FAMILY MEDICINE

## 2019-11-20 PROCEDURE — 84439 ASSAY OF FREE THYROXINE: CPT | Performed by: FAMILY MEDICINE

## 2019-11-20 PROCEDURE — 82607 VITAMIN B-12: CPT | Performed by: FAMILY MEDICINE

## 2019-11-20 PROCEDURE — 80050 GENERAL HEALTH PANEL: CPT | Performed by: FAMILY MEDICINE

## 2019-11-20 PROCEDURE — 36415 COLL VENOUS BLD VENIPUNCTURE: CPT | Performed by: FAMILY MEDICINE

## 2019-11-20 PROCEDURE — 80061 LIPID PANEL: CPT | Performed by: FAMILY MEDICINE

## 2019-11-20 PROCEDURE — 82306 VITAMIN D 25 HYDROXY: CPT | Performed by: FAMILY MEDICINE

## 2019-11-20 PROCEDURE — 86800 THYROGLOBULIN ANTIBODY: CPT | Performed by: FAMILY MEDICINE

## 2019-12-05 RX ORDER — BUPROPION HYDROCHLORIDE 150 MG/1
TABLET, EXTENDED RELEASE ORAL
Qty: 60 TABLET | Refills: 0 | Status: SHIPPED | OUTPATIENT
Start: 2019-12-05 | End: 2020-01-08

## 2019-12-05 NOTE — TELEPHONE ENCOUNTER
Rx Request  buPROPion HCl ER, SR, 150 MG Oral Tablet 12 Hr    Disp:      60              R: 0    Associated Dx:BMI 31.0-31.9,adult    Last Visit: 11/12/2019    Last Refilled: 11/12/2019

## 2019-12-16 ENCOUNTER — HOSPITAL ENCOUNTER (OUTPATIENT)
Dept: ULTRASOUND IMAGING | Age: 29
Discharge: HOME | End: 2019-12-16
Attending: FAMILY MEDICINE
Payer: COMMERCIAL

## 2019-12-16 DIAGNOSIS — E04.9 GOITER: ICD-10-CM

## 2019-12-16 PROCEDURE — 76536 US EXAM OF HEAD AND NECK: CPT | Performed by: FAMILY MEDICINE
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
## 2020-01-24 RX ORDER — PANTOPRAZOLE SODIUM 40 MG/1
TABLET, DELAYED RELEASE ORAL
Qty: 90 TABLET | Refills: 0 | Status: SHIPPED | OUTPATIENT
Start: 2020-01-24 | End: 2020-07-29

## 2020-01-30 DIAGNOSIS — F39 MOOD DISORDER (HCC): ICD-10-CM

## 2020-01-30 RX ORDER — BUPROPION HYDROCHLORIDE 200 MG/1
TABLET, EXTENDED RELEASE ORAL
Qty: 60 TABLET | Refills: 0 | Status: SHIPPED | OUTPATIENT
Start: 2020-01-30 | End: 2020-02-12

## 2020-02-12 DIAGNOSIS — F39 MOOD DISORDER (HCC): ICD-10-CM

## 2020-02-12 RX ORDER — BUPROPION HYDROCHLORIDE 200 MG/1
200 TABLET, EXTENDED RELEASE ORAL 2 TIMES DAILY
Qty: 180 TABLET | Refills: 0 | Status: SHIPPED | OUTPATIENT
Start: 2020-02-12 | End: 2020-05-27

## 2020-02-21 RX ORDER — AMOXICILLIN AND CLAVULANATE POTASSIUM 875; 125 MG/1; MG/1
1 TABLET, FILM COATED ORAL 2 TIMES DAILY
Qty: 20 TABLET | Refills: 0 | Status: SHIPPED | OUTPATIENT
Start: 2020-02-21 | End: 2020-03-02

## 2020-03-07 ENCOUNTER — TELEPHONE (OUTPATIENT)
Dept: FAMILY MEDICINE CLINIC | Facility: CLINIC | Age: 30
End: 2020-03-07

## 2020-03-07 RX ORDER — CYCLOBENZAPRINE HCL 5 MG
5 TABLET ORAL 3 TIMES DAILY PRN
Qty: 20 TABLET | Refills: 0 | Status: SHIPPED | OUTPATIENT
Start: 2020-03-07 | End: 2020-10-22 | Stop reason: ALTCHOICE

## 2020-04-30 ENCOUNTER — TELEPHONE (OUTPATIENT)
Dept: FAMILY MEDICINE CLINIC | Facility: CLINIC | Age: 30
End: 2020-04-30

## 2020-04-30 DIAGNOSIS — M54.42 CHRONIC BILATERAL LOW BACK PAIN WITH BILATERAL SCIATICA: Primary | ICD-10-CM

## 2020-04-30 DIAGNOSIS — G89.29 CHRONIC BILATERAL LOW BACK PAIN WITH BILATERAL SCIATICA: Primary | ICD-10-CM

## 2020-04-30 DIAGNOSIS — F39 MOOD DISORDER (HCC): ICD-10-CM

## 2020-04-30 DIAGNOSIS — M54.41 CHRONIC BILATERAL LOW BACK PAIN WITH BILATERAL SCIATICA: Primary | ICD-10-CM

## 2020-04-30 RX ORDER — METHYLPREDNISOLONE 4 MG/1
TABLET ORAL
Qty: 1 KIT | Refills: 0 | Status: SHIPPED | OUTPATIENT
Start: 2020-04-30 | End: 2020-05-27

## 2020-04-30 RX ORDER — METHYLPREDNISOLONE SODIUM SUCCINATE 40 MG/ML
80 INJECTION, POWDER, LYOPHILIZED, FOR SOLUTION INTRAMUSCULAR; INTRAVENOUS ONCE
Status: COMPLETED | OUTPATIENT
Start: 2020-04-30 | End: 2020-05-01

## 2020-04-30 NOTE — TELEPHONE ENCOUNTER
Spoke to patient regarding a couple concerns. She has history of low back pain with sciatica. MRI lumbar spine from 5 years ago reveals mild to moderate of DDD L5-S1 and foraminal narrowing bilaterally.   Last flareup of symptoms was at that time with no weaning process she can start on Trintellix 10 mg once daily. Follow-up in 2 to 4 weeks. Call sooner if needed.

## 2020-05-01 PROCEDURE — 96372 THER/PROPH/DIAG INJ SC/IM: CPT | Performed by: PHYSICIAN ASSISTANT

## 2020-05-01 RX ADMIN — METHYLPREDNISOLONE SODIUM SUCCINATE 80 MG: 40 INJECTION, POWDER, LYOPHILIZED, FOR SOLUTION INTRAMUSCULAR; INTRAVENOUS at 14:52:00

## 2020-05-08 RX ORDER — SULFAMETHOXAZOLE AND TRIMETHOPRIM 800; 160 MG/1; MG/1
1 TABLET ORAL 2 TIMES DAILY
Qty: 180 TABLET | Refills: 1 | Status: SHIPPED | OUTPATIENT
Start: 2020-05-08 | End: 2020-12-16

## 2020-05-27 ENCOUNTER — TELEPHONE (OUTPATIENT)
Dept: FAMILY MEDICINE CLINIC | Facility: CLINIC | Age: 30
End: 2020-05-27

## 2020-05-27 DIAGNOSIS — F39 MOOD DISORDER (HCC): ICD-10-CM

## 2020-05-27 RX ORDER — AMITRIPTYLINE HYDROCHLORIDE 10 MG/1
10 TABLET, FILM COATED ORAL NIGHTLY
Qty: 30 TABLET | Refills: 2 | Status: SHIPPED | OUTPATIENT
Start: 2020-05-27 | End: 2020-08-08

## 2020-05-27 NOTE — TELEPHONE ENCOUNTER
Spoke to patient in regards to Trintellix. Has been on medication for depression for about 3 weeks. Has not noticed any change in mood. Has been experiencing headaches daily since starting medicine. Now completely off Wellbutrin.  After discussion decided t

## 2020-05-30 RX ORDER — BUPROPION HYDROCHLORIDE 200 MG/1
TABLET, EXTENDED RELEASE ORAL
Qty: 60 TABLET | Refills: 0 | Status: SHIPPED | OUTPATIENT
Start: 2020-05-30 | End: 2020-10-22

## 2020-07-29 ENCOUNTER — TELEPHONE (OUTPATIENT)
Dept: FAMILY MEDICINE CLINIC | Facility: CLINIC | Age: 30
End: 2020-07-29

## 2020-07-29 RX ORDER — PANTOPRAZOLE SODIUM 40 MG/1
40 TABLET, DELAYED RELEASE ORAL
Qty: 90 TABLET | Refills: 0 | Status: SHIPPED | OUTPATIENT
Start: 2020-07-29 | End: 2020-10-22

## 2020-07-29 NOTE — TELEPHONE ENCOUNTER
Patient presents today complaining of left upper quadrant abdominal pain. Has been intermittent. Occasionally feels it on the right side as well. Has improved with pantoprazole in the past.  Requests refill. No tenderness on palpation to the area.   No

## 2020-08-08 ENCOUNTER — TELEPHONE (OUTPATIENT)
Dept: FAMILY MEDICINE CLINIC | Facility: CLINIC | Age: 30
End: 2020-08-08

## 2020-08-08 RX ORDER — AMITRIPTYLINE HYDROCHLORIDE 25 MG/1
25 TABLET, FILM COATED ORAL NIGHTLY
Qty: 30 TABLET | Refills: 5 | Status: SHIPPED | OUTPATIENT
Start: 2020-08-08 | End: 2020-10-22 | Stop reason: ALTCHOICE

## 2020-08-08 NOTE — TELEPHONE ENCOUNTER
Patient presents today with ongoing concerns of anxiety and depression. Has been on amitriptyline 10 mg once nightly for this as well as for migraine prevention.   Not helping as much as it has in the past.  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

## 2020-08-24 RX ORDER — AMITRIPTYLINE HYDROCHLORIDE 10 MG/1
TABLET, FILM COATED ORAL
Qty: 90 TABLET | Refills: 0 | OUTPATIENT
Start: 2020-08-24

## 2020-08-26 DIAGNOSIS — Z78.9 PARTICIPANT IN HEALTH AND WELLNESS PLAN: Primary | ICD-10-CM

## 2020-08-27 ENCOUNTER — NURSE ONLY (OUTPATIENT)
Dept: LAB | Age: 30
End: 2020-08-27
Attending: PREVENTIVE MEDICINE

## 2020-08-27 DIAGNOSIS — Z78.9 PARTICIPANT IN HEALTH AND WELLNESS PLAN: ICD-10-CM

## 2020-08-27 LAB — SARS-COV-2 IGG SERPLBLD QL IA.RAPID: NEGATIVE

## 2020-08-27 PROCEDURE — 86769 SARS-COV-2 COVID-19 ANTIBODY: CPT

## 2020-09-24 ENCOUNTER — TELEPHONE (OUTPATIENT)
Dept: FAMILY MEDICINE CLINIC | Facility: CLINIC | Age: 30
End: 2020-09-24

## 2020-09-24 DIAGNOSIS — Z20.822 CLOSE EXPOSURE TO COVID-19 VIRUS: Primary | ICD-10-CM

## 2020-09-24 NOTE — TELEPHONE ENCOUNTER
Patient calling in today with c/o COVID exposure 10 days ago. Was with her stepmother and other family members at that time. Her stepmother developed some mild URI symptoms the next day or so and has been confirmed positive.  Patient denies any symptoms but

## 2020-10-22 ENCOUNTER — OFFICE VISIT (OUTPATIENT)
Dept: FAMILY MEDICINE CLINIC | Facility: CLINIC | Age: 30
End: 2020-10-22
Payer: COMMERCIAL

## 2020-10-22 VITALS
WEIGHT: 163 LBS | DIASTOLIC BLOOD PRESSURE: 68 MMHG | OXYGEN SATURATION: 98 % | SYSTOLIC BLOOD PRESSURE: 118 MMHG | HEART RATE: 77 BPM | HEIGHT: 61.5 IN | RESPIRATION RATE: 18 BRPM | BODY MASS INDEX: 30.38 KG/M2 | TEMPERATURE: 98 F

## 2020-10-22 DIAGNOSIS — M51.36 DDD (DEGENERATIVE DISC DISEASE), LUMBAR: Primary | ICD-10-CM

## 2020-10-22 DIAGNOSIS — N39.3 STRESS INCONTINENCE: ICD-10-CM

## 2020-10-22 DIAGNOSIS — E04.1 THYROID NODULE: ICD-10-CM

## 2020-10-22 PROCEDURE — 3008F BODY MASS INDEX DOCD: CPT | Performed by: PHYSICIAN ASSISTANT

## 2020-10-22 PROCEDURE — 99213 OFFICE O/P EST LOW 20 MIN: CPT | Performed by: PHYSICIAN ASSISTANT

## 2020-10-22 PROCEDURE — 3074F SYST BP LT 130 MM HG: CPT | Performed by: PHYSICIAN ASSISTANT

## 2020-10-22 PROCEDURE — 3078F DIAST BP <80 MM HG: CPT | Performed by: PHYSICIAN ASSISTANT

## 2020-10-22 RX ORDER — TIZANIDINE 4 MG/1
4 TABLET ORAL EVERY 8 HOURS PRN
Qty: 15 TABLET | Refills: 0 | Status: SHIPPED | OUTPATIENT
Start: 2020-10-22 | End: 2020-12-03

## 2020-10-22 RX ORDER — IBUPROFEN 800 MG/1
800 TABLET ORAL EVERY 8 HOURS PRN
Qty: 90 TABLET | Refills: 1 | Status: SHIPPED | OUTPATIENT
Start: 2020-10-22 | End: 2021-06-18

## 2020-10-22 RX ORDER — PANTOPRAZOLE SODIUM 40 MG/1
40 TABLET, DELAYED RELEASE ORAL
Qty: 90 TABLET | Refills: 1 | Status: SHIPPED | OUTPATIENT
Start: 2020-10-22 | End: 2021-04-19

## 2020-10-22 NOTE — PROGRESS NOTES
HPI:    Patient ID: Real Jovel is a 26year old female. HPI   Patient presents today for follow-up of chronic back pain. Started about 7 years ago. Most likely triggered by heavy lifting at the time.   Since then back pain has flared up several time Tab Take 1 tablet (800 mg total) by mouth every 8 (eight) hours as needed for Pain.  90 tablet 1   • Pantoprazole Sodium 40 MG Oral Tab EC Take 1 tablet (40 mg total) by mouth every morning before breakfast. 90 tablet 1   • tiZANidine HCl 4 MG Oral Tab Take many times over the years. Given the progression and severity of her pain we will repeat MRI L-spine at this time and follow-up pending results.   In the meantime continue ibuprofen 800 mg 1-2 times daily, take with PPI, lidocaine patch as needed, and tiza

## 2020-10-29 ENCOUNTER — OFFICE VISIT (OUTPATIENT)
Dept: FAMILY MEDICINE CLINIC | Facility: CLINIC | Age: 30
End: 2020-10-29
Payer: COMMERCIAL

## 2020-10-29 VITALS
WEIGHT: 168.63 LBS | BODY MASS INDEX: 31.84 KG/M2 | OXYGEN SATURATION: 100 % | DIASTOLIC BLOOD PRESSURE: 70 MMHG | HEIGHT: 61 IN | SYSTOLIC BLOOD PRESSURE: 100 MMHG | RESPIRATION RATE: 16 BRPM | HEART RATE: 92 BPM | TEMPERATURE: 98 F

## 2020-10-29 DIAGNOSIS — Z20.822 EXPOSURE TO COVID-19 VIRUS: ICD-10-CM

## 2020-10-29 DIAGNOSIS — Z20.822 SUSPECTED COVID-19 VIRUS INFECTION: Primary | ICD-10-CM

## 2020-10-29 DIAGNOSIS — R52 GENERALIZED BODY ACHES: ICD-10-CM

## 2020-10-29 PROCEDURE — 99213 OFFICE O/P EST LOW 20 MIN: CPT | Performed by: NURSE PRACTITIONER

## 2020-10-29 PROCEDURE — 3078F DIAST BP <80 MM HG: CPT | Performed by: NURSE PRACTITIONER

## 2020-10-29 PROCEDURE — 3074F SYST BP LT 130 MM HG: CPT | Performed by: NURSE PRACTITIONER

## 2020-10-29 PROCEDURE — 3008F BODY MASS INDEX DOCD: CPT | Performed by: NURSE PRACTITIONER

## 2020-10-29 PROCEDURE — U0003 INFECTIOUS AGENT DETECTION BY NUCLEIC ACID (DNA OR RNA); SEVERE ACUTE RESPIRATORY SYNDROME CORONAVIRUS 2 (SARS-COV-2) (CORONAVIRUS DISEASE [COVID-19]), AMPLIFIED PROBE TECHNIQUE, MAKING USE OF HIGH THROUGHPUT TECHNOLOGIES AS DESCRIBED BY CMS-2020-01-R: HCPCS | Performed by: NURSE PRACTITIONER

## 2020-10-29 RX ORDER — BUPROPION HYDROCHLORIDE 200 MG/1
200 TABLET, EXTENDED RELEASE ORAL 2 TIMES DAILY
COMMUNITY
End: 2021-01-22

## 2020-10-29 NOTE — PROGRESS NOTES
CHIEF COMPLAINT:   Patient presents with:  Covid: dry mouth grainey under tongue upper back pain headache fatigue X yesterday      HPI:   Ann Mccoy is a 26year old female who presents for possible Covid 19 exposure.   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 Reviewed symptom relief measures with patient if symptoms develop. COVID swab sent.     Discussed asymptomatic exposure guidelines:   -Quarantine 14 days from last significant exposure, doesn't change based on negative results     To Some people have no symptoms or mild symptoms. Symptoms can also vary from person to person. As experts learn more about COVID-19, other symptoms are being reported.  Symptoms may appear 2 to 14 days after contact with the virus:   · Fever  · Coughing  · Tr · A fever over 100.4°F (38.0°C) for more than 24 hours and a positive SARS-CoV-2 test or exposure to the virus in the last 4 weeks  · Inflammation in at least 2 organs such as the heart, lungs, or kidneys with lab tests that show inflammation  · No other d There is currently no medicine proven to prevent or treat the virus. Some experimental medicines are being tested for COVID-19.  Other medicines used to treat other conditions are being looked at for COVID-19, but these are not currently approved to treat i People who have had COVID-19 and are fully recovered may be asked by their healthcare team to consider donating plasma. This is called COVID-19 convalescent plasma donation.  Plasma from people fully recovered from COVID-19 may contain antibodies to help fi

## 2020-10-29 NOTE — PATIENT INSTRUCTIONS
Coronavirus Disease 2019 (COVID-19): Overview  Coronavirus disease 2019 (COVID-19) is a respiratory illness. It's caused by a new (novel) coronavirus. There are many types of coronavirus. Coronaviruses are a very common cause of colds and bronchitis.  The You can check your symptoms with the CDC’s Coronavirus Self-. What are possible complications from DIWMX-79? In many cases, this virus can cause infection (pneumonia) in both lungs. In some cases, this can cause death.  Certain people are at highe Your healthcare provider will ask about your symptoms. He or she will ask where you live, and about your recent travel, and any contact with sick people.  If your healthcare provider thinks you may have COVID-19, he or she will consider whether to test you The most proven treatments right now are those to help your body while it fights the virus. This is known as supportive care. Supportive care may include:   · Getting rest.  This helps your body fight the illness. · Staying hydrated.   Drinking liquids is People who have had COVID-19 and are fully recovered may be asked by their healthcare team to consider donating plasma. This is called COVID-19 convalescent plasma donation.  Plasma from people fully recovered from COVID-19 may contain antibodies to help fi

## 2020-12-16 RX ORDER — SULFAMETHOXAZOLE AND TRIMETHOPRIM 800; 160 MG/1; MG/1
1 TABLET ORAL 2 TIMES DAILY
Qty: 180 TABLET | Refills: 1 | Status: SHIPPED | OUTPATIENT
Start: 2020-12-16 | End: 2021-10-27

## 2020-12-16 RX ORDER — TRETINOIN 0.8 MG/G
1 GEL TOPICAL NIGHTLY
Qty: 50 G | Refills: 4 | Status: SHIPPED | OUTPATIENT
Start: 2020-12-16

## 2020-12-20 ENCOUNTER — IMMUNIZATION (OUTPATIENT)
Dept: LAB | Facility: HOSPITAL | Age: 30
End: 2020-12-20
Attending: PREVENTIVE MEDICINE
Payer: COMMERCIAL

## 2020-12-20 DIAGNOSIS — Z23 NEED FOR VACCINATION: ICD-10-CM

## 2020-12-20 PROCEDURE — 0001A PFIZER-BIONTECH COVID-19 VACCINE: CPT

## 2021-01-10 ENCOUNTER — IMMUNIZATION (OUTPATIENT)
Dept: LAB | Facility: HOSPITAL | Age: 31
End: 2021-01-10
Attending: PREVENTIVE MEDICINE
Payer: COMMERCIAL

## 2021-01-10 DIAGNOSIS — Z23 NEED FOR VACCINATION: ICD-10-CM

## 2021-01-10 PROCEDURE — 0002A SARSCOV2 VAC 30MCG/0.3ML IM: CPT

## 2021-01-25 ENCOUNTER — TELEPHONE (OUTPATIENT)
Dept: FAMILY MEDICINE CLINIC | Facility: CLINIC | Age: 31
End: 2021-01-25

## 2021-01-25 RX ORDER — BUPROPION HYDROCHLORIDE 200 MG/1
200 TABLET, EXTENDED RELEASE ORAL 2 TIMES DAILY
Qty: 60 TABLET | Refills: 2 | Status: SHIPPED | OUTPATIENT
Start: 2021-01-25 | End: 2021-04-19

## 2021-01-25 NOTE — TELEPHONE ENCOUNTER
Patient calling to report the pharmacy does not have her Bupropion 200 mg. Rx was sent on 1/22/21 and received by pharmacy. She would like it routed to [facility] in [place]. rx sent.

## 2021-02-06 ENCOUNTER — NURSE ONLY (OUTPATIENT)
Dept: FAMILY MEDICINE CLINIC | Facility: CLINIC | Age: 31
End: 2021-02-06
Payer: COMMERCIAL

## 2021-02-06 DIAGNOSIS — Z30.9 ENCOUNTER FOR CONTRACEPTIVE MANAGEMENT, UNSPECIFIED TYPE: Primary | ICD-10-CM

## 2021-02-06 PROCEDURE — 81025 URINE PREGNANCY TEST: CPT | Performed by: FAMILY MEDICINE

## 2021-02-08 ENCOUNTER — TELEMEDICINE (OUTPATIENT)
Dept: FAMILY MEDICINE CLINIC | Facility: CLINIC | Age: 31
End: 2021-02-08

## 2021-02-08 DIAGNOSIS — G89.29 CHRONIC RIGHT-SIDED THORACIC BACK PAIN: ICD-10-CM

## 2021-02-08 DIAGNOSIS — M54.6 CHRONIC RIGHT-SIDED THORACIC BACK PAIN: ICD-10-CM

## 2021-02-08 DIAGNOSIS — E66.3 OVERWEIGHT (BMI 25.0-29.9): ICD-10-CM

## 2021-02-08 DIAGNOSIS — M25.50 MULTIPLE JOINT PAIN: Primary | ICD-10-CM

## 2021-02-08 LAB — CONTROL LINE PRESENT WITH A CLEAR BACKGROUND (YES/NO): YES YES/NO

## 2021-02-08 PROCEDURE — 99214 OFFICE O/P EST MOD 30 MIN: CPT | Performed by: PHYSICIAN ASSISTANT

## 2021-02-08 RX ORDER — NALTREXONE HYDROCHLORIDE 50 MG/1
50 TABLET, FILM COATED ORAL DAILY
Qty: 30 TABLET | Refills: 2 | Status: SHIPPED | OUTPATIENT
Start: 2021-02-08

## 2021-02-08 RX ORDER — PREDNISONE 10 MG/1
TABLET ORAL
Qty: 18 TABLET | Refills: 0 | Status: SHIPPED | OUTPATIENT
Start: 2021-02-08 | End: 2021-04-24

## 2021-02-08 NOTE — PROGRESS NOTES
Video Visit    Erica Ibanez is a 26year old female. No chief complaint on file. HPI:   Patient presents today with concerns of multiple joint pain and muscle tightness in her back.   She has chronic back pain, she has had imaging done and undergone MG,) 20 MCG/24HR Intrauterine IUD 1 each by Intrauterine route once.         Past Medical History:   Diagnosis Date   • Acne vulgaris    • Back pain due to inflammatory process     L5-S1   • Head injury    • Migraines    • Missed  11/10/2015 (primary encounter diagnosis)  Plan: NSAIDs help but she is reluctant to continue taking them so often. We will treat with a prednisone taper. Continue to apply ice and exercise gently. Follow-up if symptoms worsen or do not improve with these measures. consent forms and documents. which are located on the [facility] website. The patient verbally agreed to telehealth consent form, related consents and the risks discussed. Lastly, the patient confirmed that they were in [place].    Included in this visit, braden

## 2021-04-19 RX ORDER — PANTOPRAZOLE SODIUM 40 MG/1
TABLET, DELAYED RELEASE ORAL
Qty: 90 TABLET | Refills: 1 | Status: SHIPPED | OUTPATIENT
Start: 2021-04-19

## 2021-04-19 RX ORDER — BUPROPION HYDROCHLORIDE 200 MG/1
TABLET, EXTENDED RELEASE ORAL
Qty: 180 TABLET | Refills: 0 | Status: SHIPPED | OUTPATIENT
Start: 2021-04-19 | End: 2021-05-31

## 2021-04-24 ENCOUNTER — MOBILE ENCOUNTER (OUTPATIENT)
Dept: FAMILY MEDICINE CLINIC | Facility: CLINIC | Age: 31
End: 2021-04-24

## 2021-04-24 DIAGNOSIS — M54.42 CHRONIC BILATERAL LOW BACK PAIN WITH BILATERAL SCIATICA: Primary | ICD-10-CM

## 2021-04-24 DIAGNOSIS — G89.29 CHRONIC BILATERAL LOW BACK PAIN WITH BILATERAL SCIATICA: Primary | ICD-10-CM

## 2021-04-24 DIAGNOSIS — M54.41 CHRONIC BILATERAL LOW BACK PAIN WITH BILATERAL SCIATICA: Primary | ICD-10-CM

## 2021-05-17 RX ORDER — GUANFACINE 1 MG/1
TABLET, EXTENDED RELEASE ORAL
Qty: 30 TABLET | Refills: 0 | Status: SHIPPED | OUTPATIENT
Start: 2021-05-17 | End: 2021-05-19 | Stop reason: DRUGHIGH

## 2021-05-17 NOTE — TELEPHONE ENCOUNTER
Rx Request  guanFACINE HCl ER 1 MG Oral Tablet 24 Hr    Disp:    30                R: 0    Last Refilled: 04/24/2021     Last Visit: 02/08/2021 (virtual)

## 2021-05-19 RX ORDER — GUANFACINE 2 MG/1
2 TABLET, EXTENDED RELEASE ORAL DAILY
Qty: 90 TABLET | Refills: 1 | Status: SHIPPED | OUTPATIENT
Start: 2021-05-19 | End: 2021-11-17

## 2021-05-27 ENCOUNTER — OFFICE VISIT (OUTPATIENT)
Dept: NEUROLOGY | Facility: CLINIC | Age: 31
End: 2021-05-27
Payer: COMMERCIAL

## 2021-05-27 VITALS
SYSTOLIC BLOOD PRESSURE: 132 MMHG | BODY MASS INDEX: 32.66 KG/M2 | HEIGHT: 61 IN | OXYGEN SATURATION: 99 % | DIASTOLIC BLOOD PRESSURE: 80 MMHG | WEIGHT: 173 LBS | HEART RATE: 116 BPM

## 2021-05-27 DIAGNOSIS — M51.27 HERNIATED NUCLEUS PULPOSUS, L5-S1: ICD-10-CM

## 2021-05-27 DIAGNOSIS — M25.511 CHRONIC RIGHT SHOULDER PAIN: Primary | ICD-10-CM

## 2021-05-27 DIAGNOSIS — G89.29 CHRONIC RIGHT SHOULDER PAIN: Primary | ICD-10-CM

## 2021-05-27 PROCEDURE — 3075F SYST BP GE 130 - 139MM HG: CPT | Performed by: PHYSICAL MEDICINE & REHABILITATION

## 2021-05-27 PROCEDURE — 99204 OFFICE O/P NEW MOD 45 MIN: CPT | Performed by: PHYSICAL MEDICINE & REHABILITATION

## 2021-05-27 PROCEDURE — 3079F DIAST BP 80-89 MM HG: CPT | Performed by: PHYSICAL MEDICINE & REHABILITATION

## 2021-05-27 PROCEDURE — 3008F BODY MASS INDEX DOCD: CPT | Performed by: PHYSICAL MEDICINE & REHABILITATION

## 2021-05-27 RX ORDER — GABAPENTIN 100 MG/1
CAPSULE ORAL
Qty: 60 CAPSULE | Refills: 0 | Status: SHIPPED | OUTPATIENT
Start: 2021-05-27 | End: 2021-07-12

## 2021-05-27 NOTE — H&P
Jak Bates    History and Physical    Jeancarlos García Patient Status:  No patient class for patient encounter    1990 MRN [id number]   [facility] [place] [address] Attending No att. providers found   [facility] Day # 0 PCP have increased right shoulder pain with overhead movements. She reports pain in the right interscapular area, and over the lateral aspect of the right shoulder in the area of the [facility] [place] joint. Nonradiating. No history of previous shoulder problems.  She had an denies  Rectal Pain: denies   Hematology  Easy Bruising: denies  Easy Bleeding: denies   Genitourinary  Difficulty Urinating: denies  Bladder Incontinence: denies  Pelvic Pain: denies  Painful Urination: denies   Musculoskeletal  Joint Stiffness: admits  P distribution    Reflexes: 2/4 quad and gastroc reflexes b/l   Skin: Skin is warm and dry. No rash noted.      Results:     Lab Results   Component Value Date    WBC 6.2 11/20/2019    HGB 12.8 11/20/2019    HCT 38.0 11/20/2019    .0 11/20/2019    CREA

## 2021-06-02 RX ORDER — BUPROPION HYDROCHLORIDE 200 MG/1
200 TABLET, EXTENDED RELEASE ORAL 2 TIMES DAILY
Qty: 180 TABLET | Refills: 0 | Status: SHIPPED | OUTPATIENT
Start: 2021-06-02 | End: 2021-10-27

## 2021-06-18 RX ORDER — IBUPROFEN 800 MG/1
TABLET ORAL
Qty: 90 TABLET | Refills: 1 | Status: SHIPPED | OUTPATIENT
Start: 2021-06-18 | End: 2021-12-06

## 2021-07-09 DIAGNOSIS — G89.29 CHRONIC RIGHT SHOULDER PAIN: ICD-10-CM

## 2021-07-09 DIAGNOSIS — M51.27 HERNIATED NUCLEUS PULPOSUS, L5-S1: ICD-10-CM

## 2021-07-09 DIAGNOSIS — M25.511 CHRONIC RIGHT SHOULDER PAIN: ICD-10-CM

## 2021-07-12 RX ORDER — GABAPENTIN 100 MG/1
100 CAPSULE ORAL 2 TIMES DAILY
Qty: 180 CAPSULE | Refills: 0 | Status: SHIPPED | OUTPATIENT
Start: 2021-07-12 | End: 2022-02-10

## 2021-07-12 NOTE — TELEPHONE ENCOUNTER
Medication request: Gabapentin 100mg 1 cap PO at bedtime x1 week, then BID if no side effects    LOV: 05/27/21  NOV: none    [place]    Patient states she just started taking it twice daily and it is helping.

## 2021-08-18 ENCOUNTER — TELEPHONE (OUTPATIENT)
Dept: PHYSICAL THERAPY | Facility: HOSPITAL | Age: 31
End: 2021-08-18

## 2021-08-23 ENCOUNTER — OFFICE VISIT (OUTPATIENT)
Dept: PHYSICAL THERAPY | Age: 31
End: 2021-08-23
Attending: PHYSICAL MEDICINE & REHABILITATION
Payer: COMMERCIAL

## 2021-08-23 DIAGNOSIS — M51.27 HERNIATED NUCLEUS PULPOSUS, L5-S1: ICD-10-CM

## 2021-08-23 DIAGNOSIS — M25.511 CHRONIC RIGHT SHOULDER PAIN: ICD-10-CM

## 2021-08-23 DIAGNOSIS — G89.29 CHRONIC RIGHT SHOULDER PAIN: ICD-10-CM

## 2021-08-23 PROCEDURE — 97110 THERAPEUTIC EXERCISES: CPT

## 2021-08-23 PROCEDURE — 97161 PT EVAL LOW COMPLEX 20 MIN: CPT

## 2021-08-23 RX ORDER — BUPROPION HYDROCHLORIDE 200 MG/1
TABLET, EXTENDED RELEASE ORAL
Qty: 180 TABLET | Refills: 0 | OUTPATIENT
Start: 2021-08-23

## 2021-08-23 NOTE — TELEPHONE ENCOUNTER
Rx Request  buPROPion HCl ER, SR, 200 MG Oral Tablet 12 Hr    Disp:     180               R: 0    Last Refilled: 06/02/2021    Last Visit: 02/28/2021

## 2021-08-23 NOTE — PROGRESS NOTES
SPINE EVALUATION:   Referring Physician: Dr. Any Murillo  Diagnosis: Lumbar Instability;  Right Shoulder Strain     Date of Service: 8/23/2021     PATIENT SUMMARY   Alexia Vieyra is a 26year old y/o female who presents to therapy today with complaints of ce posturing and exercise. Precautions:  None  OBJECTIVE:   Observation/Posture: The patient presents with a structural assessment of an increase in posterior pelvic tilt and loss of lordosis and thoracic kyphosis.     Gait: Normal  Neuro Screen: The frida Increase FOTO assessment > 11% from INE to DC. 2. Patient will be aware of postural limitations and be able to correct them independently.     3. Patient will have an increase in spinal mobility to normal in order to return to sustained posturing and exerc

## 2021-08-25 ENCOUNTER — OFFICE VISIT (OUTPATIENT)
Dept: PHYSICAL THERAPY | Age: 31
End: 2021-08-25
Attending: PHYSICAL MEDICINE & REHABILITATION
Payer: COMMERCIAL

## 2021-08-25 PROCEDURE — 97110 THERAPEUTIC EXERCISES: CPT

## 2021-08-25 PROCEDURE — 97140 MANUAL THERAPY 1/> REGIONS: CPT

## 2021-08-25 NOTE — PROGRESS NOTES
Dx: Left supraspinatus tendinitis and lumbar instability         Authorized # of Visits:  12 (PPO)         Next MD visit: none scheduled  Fall Risk: standard         Precautions: n/a      Last MD Note: 08/23/2021     Goal Number: 12    Subjective: States t

## 2021-08-30 ENCOUNTER — OFFICE VISIT (OUTPATIENT)
Dept: PHYSICAL THERAPY | Age: 31
End: 2021-08-30
Attending: PHYSICAL MEDICINE & REHABILITATION
Payer: COMMERCIAL

## 2021-08-30 PROCEDURE — 97110 THERAPEUTIC EXERCISES: CPT

## 2021-08-30 PROCEDURE — 97140 MANUAL THERAPY 1/> REGIONS: CPT

## 2021-08-30 NOTE — PROGRESS NOTES
Dx: Left supraspinatus tendinitis and lumbar instability         Authorized # of Visits:  12 (PPO)         Next MD visit: none scheduled  Fall Risk: standard         Precautions: n/a      Last MD Note: 08/23/2021     Goal Number: 12    Subjective:  Today, t (25 min);  Manual PT 1 (15 min)       Total Timed Treatment: 40 min  Total Treatment Time: 40 min

## 2021-09-01 ENCOUNTER — APPOINTMENT (OUTPATIENT)
Dept: PHYSICAL THERAPY | Age: 31
End: 2021-09-01
Attending: PHYSICAL MEDICINE & REHABILITATION
Payer: COMMERCIAL

## 2021-09-08 ENCOUNTER — OFFICE VISIT (OUTPATIENT)
Dept: PHYSICAL THERAPY | Age: 31
End: 2021-09-08
Attending: PHYSICAL MEDICINE & REHABILITATION
Payer: COMMERCIAL

## 2021-09-08 PROCEDURE — 97140 MANUAL THERAPY 1/> REGIONS: CPT

## 2021-09-08 PROCEDURE — 97110 THERAPEUTIC EXERCISES: CPT

## 2021-09-08 NOTE — PROGRESS NOTES
Dx: Left supraspinatus tendinitis and lumbar instability         Authorized # of Visits:  12 (PPO)         Next MD visit: none scheduled  Fall Risk: standard         Precautions: n/a      Last MD Note: 08/23/2021     Goal Number: 12    Subjective: Lumbar s 5. Patient will demonstrate an increase in MLT of rectus femoris and iliopsoas in order to return to sustained posturing. Plan: Assess response and progress as tolerated. Charges: TherEx 2 (25 min);  Manual PT 1 (15 min)       Total Timed Treatm

## 2021-09-13 ENCOUNTER — APPOINTMENT (OUTPATIENT)
Dept: PHYSICAL THERAPY | Age: 31
End: 2021-09-13
Attending: PHYSICAL MEDICINE & REHABILITATION
Payer: COMMERCIAL

## 2021-09-13 ENCOUNTER — TELEPHONE (OUTPATIENT)
Dept: PHYSICAL THERAPY | Facility: HOSPITAL | Age: 31
End: 2021-09-13

## 2021-09-15 ENCOUNTER — OFFICE VISIT (OUTPATIENT)
Dept: PHYSICAL THERAPY | Age: 31
End: 2021-09-15
Attending: PHYSICAL MEDICINE & REHABILITATION
Payer: COMMERCIAL

## 2021-09-15 PROCEDURE — 97140 MANUAL THERAPY 1/> REGIONS: CPT

## 2021-09-15 PROCEDURE — 97110 THERAPEUTIC EXERCISES: CPT

## 2021-09-15 NOTE — PROGRESS NOTES
Dx: Left supraspinatus tendinitis and lumbar instability         Authorized # of Visits:  12 (PPO)         Next MD visit: none scheduled  Fall Risk: standard         Precautions: n/a      Last MD Note: 08/23/2021     Goal Number: 12    Subjective: Lumbar s aware of postural limitations and be able to correct them independently. 3. Patient will have an increase in spinal mobility to normal in order to return to sustained posturing and exercise.     4. Patient will have an increase in core strength to assist

## 2021-09-17 ENCOUNTER — OFFICE VISIT (OUTPATIENT)
Dept: FAMILY MEDICINE CLINIC | Facility: CLINIC | Age: 31
End: 2021-09-17
Payer: COMMERCIAL

## 2021-09-17 VITALS
OXYGEN SATURATION: 100 % | HEIGHT: 61 IN | BODY MASS INDEX: 32.1 KG/M2 | RESPIRATION RATE: 16 BRPM | HEART RATE: 94 BPM | WEIGHT: 170 LBS | TEMPERATURE: 98 F | SYSTOLIC BLOOD PRESSURE: 110 MMHG | DIASTOLIC BLOOD PRESSURE: 78 MMHG

## 2021-09-17 DIAGNOSIS — Z20.818 EXPOSURE TO STREP THROAT: Primary | ICD-10-CM

## 2021-09-17 LAB
CONTROL LINE PRESENT WITH A CLEAR BACKGROUND (YES/NO): YES YES/NO
KIT LOT #: NORMAL NUMERIC
STREP GRP A CUL-SCR: NEGATIVE

## 2021-09-17 PROCEDURE — 3078F DIAST BP <80 MM HG: CPT | Performed by: NURSE PRACTITIONER

## 2021-09-17 PROCEDURE — 3074F SYST BP LT 130 MM HG: CPT | Performed by: NURSE PRACTITIONER

## 2021-09-17 PROCEDURE — 87081 CULTURE SCREEN ONLY: CPT | Performed by: NURSE PRACTITIONER

## 2021-09-17 PROCEDURE — 3008F BODY MASS INDEX DOCD: CPT | Performed by: NURSE PRACTITIONER

## 2021-09-17 PROCEDURE — 99213 OFFICE O/P EST LOW 20 MIN: CPT | Performed by: NURSE PRACTITIONER

## 2021-09-17 PROCEDURE — 87880 STREP A ASSAY W/OPTIC: CPT | Performed by: NURSE PRACTITIONER

## 2021-09-17 RX ORDER — AMOXICILLIN 875 MG/1
875 TABLET, COATED ORAL 2 TIMES DAILY
Qty: 20 TABLET | Refills: 0 | Status: SHIPPED | OUTPATIENT
Start: 2021-09-17 | End: 2021-09-27

## 2021-09-17 NOTE — PROGRESS NOTES
CHIEF COMPLAINT:   Patient presents with:  Sore Throat: Son on antibiotics for strep throat seen in walk in on Monday - Entered by patient      HPI:   Enzo Blake is a 33year old female presents to clinic with complaint of a dry, itchy throat.  Sarah MICRO PUMP) 0.08 % External Gel Apply 1 Application topically nightly.  To face and chest 50 g 4      Past Medical History:   Diagnosis Date   • Acne vulgaris    • Back pain due to inflammatory process 2012    L5-S1   • Head injury 2008   • Migraines    • M Result Value Ref Range    Strep Grp A Screen Negative Negative    Control Line Present with a clear background (yes/no) Yes Yes/No    Kit Lot # [id number] Numeric    Kit Expiration Date 12/20/2021 Date         ASSESSMENT AND PLAN:   Assessment: 1.  Pharyngit

## 2021-09-27 ENCOUNTER — OFFICE VISIT (OUTPATIENT)
Dept: PHYSICAL THERAPY | Age: 31
End: 2021-09-27
Attending: PHYSICAL MEDICINE & REHABILITATION
Payer: COMMERCIAL

## 2021-09-27 PROCEDURE — 97110 THERAPEUTIC EXERCISES: CPT

## 2021-09-27 PROCEDURE — 97140 MANUAL THERAPY 1/> REGIONS: CPT

## 2021-09-27 NOTE — PROGRESS NOTES
Dx: Left supraspinatus tendinitis and lumbar instability         Authorized # of Visits:  12 (PPO)         Next MD visit: none scheduled  Fall Risk: standard         Precautions: n/a      Last MD Note: 08/23/2021     Goal Number: 12    Subjective: Felt goo provoke symptoms. Goals (12 visits):    1. Increase FOTO assessment > 11% from INE to DC. 2. Patient will be aware of postural limitations and be able to correct them independently.     3. Patient will have an increase in spinal mobility to normal in o

## 2021-09-29 ENCOUNTER — OFFICE VISIT (OUTPATIENT)
Dept: PHYSICAL THERAPY | Age: 31
End: 2021-09-29
Attending: PHYSICAL MEDICINE & REHABILITATION
Payer: COMMERCIAL

## 2021-09-29 PROCEDURE — 97110 THERAPEUTIC EXERCISES: CPT

## 2021-09-29 PROCEDURE — 97140 MANUAL THERAPY 1/> REGIONS: CPT

## 2021-09-29 NOTE — PROGRESS NOTES
Dx: Left supraspinatus tendinitis and lumbar instability         Authorized # of Visits:  12 (PPO)         Next MD visit: none scheduled  Fall Risk: standard         Precautions: n/a      Last MD Note: 08/23/2021     Goal Number: 12    Subjective: Feeling blue x 20 Supine Rows and DIagonals blue x 20 GR Hamstrings x 10 GR Hamstrings x 10    GR hip flexor stretch 10 sec x 5  TrA recruitment with BKFO x 10 TrA recruitment with BKFO x 10        SL CLamshells red x 20 SL CLamshells red x 20; Bridging x 20 SL CL

## 2021-10-27 RX ORDER — BUPROPION HYDROCHLORIDE 200 MG/1
200 TABLET, EXTENDED RELEASE ORAL 2 TIMES DAILY
Qty: 180 TABLET | Refills: 0 | Status: SHIPPED | OUTPATIENT
Start: 2021-10-27 | End: 2022-01-31

## 2021-11-17 RX ORDER — GUANFACINE 2 MG/1
TABLET, EXTENDED RELEASE ORAL
Qty: 90 TABLET | Refills: 1 | Status: SHIPPED | OUTPATIENT
Start: 2021-11-17 | End: 2021-11-19

## 2021-11-19 ENCOUNTER — HOSPITAL ENCOUNTER (EMERGENCY)
Facility: HOSPITAL | Age: 31
Discharge: HOME | End: 2021-11-19
Attending: EMERGENCY MEDICINE
Payer: COMMERCIAL

## 2021-11-19 ENCOUNTER — HOSPITAL ENCOUNTER (OUTPATIENT)
Age: 31
Discharge: ANOTHER HOSPITAL | End: 2021-11-19
Payer: COMMERCIAL

## 2021-11-19 ENCOUNTER — APPOINTMENT (OUTPATIENT)
Dept: GENERAL RADIOLOGY | Age: 31
End: 2021-11-19
Attending: NURSE PRACTITIONER
Payer: COMMERCIAL

## 2021-11-19 VITALS
SYSTOLIC BLOOD PRESSURE: 142 MMHG | TEMPERATURE: 98 F | WEIGHT: 174 LBS | RESPIRATION RATE: 18 BRPM | OXYGEN SATURATION: 100 % | DIASTOLIC BLOOD PRESSURE: 91 MMHG | HEIGHT: 61 IN | HEART RATE: 79 BPM | BODY MASS INDEX: 32.85 KG/M2

## 2021-11-19 VITALS
HEIGHT: 61 IN | TEMPERATURE: 98 F | DIASTOLIC BLOOD PRESSURE: 76 MMHG | RESPIRATION RATE: 16 BRPM | BODY MASS INDEX: 32.85 KG/M2 | HEART RATE: 90 BPM | SYSTOLIC BLOOD PRESSURE: 130 MMHG | OXYGEN SATURATION: 100 % | WEIGHT: 174 LBS

## 2021-11-19 DIAGNOSIS — R13.10 DYSPHAGIA, UNSPECIFIED TYPE: ICD-10-CM

## 2021-11-19 DIAGNOSIS — R13.10 ODYNOPHAGIA: Primary | ICD-10-CM

## 2021-11-19 DIAGNOSIS — R07.0 THROAT DISCOMFORT: Primary | ICD-10-CM

## 2021-11-19 PROCEDURE — 99283 EMERGENCY DEPT VISIT LOW MDM: CPT

## 2021-11-19 PROCEDURE — 71046 X-RAY EXAM CHEST 2 VIEWS: CPT | Performed by: NURSE PRACTITIONER

## 2021-11-19 PROCEDURE — 93005 ELECTROCARDIOGRAM TRACING: CPT

## 2021-11-19 PROCEDURE — 99215 OFFICE O/P EST HI 40 MIN: CPT

## 2021-11-19 PROCEDURE — 93010 ELECTROCARDIOGRAM REPORT: CPT

## 2021-11-19 RX ORDER — MAGNESIUM HYDROXIDE/ALUMINUM HYDROXICE/SIMETHICONE 120; 1200; 1200 MG/30ML; MG/30ML; MG/30ML
30 SUSPENSION ORAL ONCE
Status: COMPLETED | OUTPATIENT
Start: 2021-11-19 | End: 2021-11-19

## 2021-11-19 RX ORDER — LIDOCAINE HYDROCHLORIDE 20 MG/ML
10 SOLUTION OROPHARYNGEAL ONCE
Status: COMPLETED | OUTPATIENT
Start: 2021-11-19 | End: 2021-11-19

## 2021-11-19 RX ORDER — GUANFACINE 2 MG/1
2 TABLET ORAL NIGHTLY
COMMUNITY

## 2021-11-19 NOTE — ED INITIAL ASSESSMENT (HPI)
C/o throat discomfort after eating last night, had soup and sandwich. States \"feels like something is stuck\", patient c/o chest discomfort,denies SOB and dizziness.  Denies sore throat or fever

## 2021-11-19 NOTE — ED PROVIDER NOTES
Patient Seen in: [facility] Emergency Department      History   Patient presents with:  FB in Throat    Stated Complaint: throat pain    Subjective:   HPI    20-year-old female presents to the emergency room for evaluation of odynophagia/dysphagia. Temp src Oral   SpO2 98 %   O2 Device None (Room air)       Current:BP (!) 142/91   Pulse 79   Temp 98.2 °F (36.8 °C) (Oral)   Resp 18   Ht 154.9 cm (5' 1\")   Wt 78.9 kg   SpO2 100%   BMI 32.88 kg/m²         Physical Exam    GENERAL: Patient is awake, a

## 2021-11-19 NOTE — ED PROVIDER NOTES
Patient Seen in: [facility]      History   Patient presents with:  Sore Throat  Chest Pain    Stated Complaint: swallowing issue / back pain    Subjective:   41-year-old female here for evaluation of possible food bolus.   States she was e systems reviewed and are negative. Positive for stated complaint: swallowing issue / back pain  Other systems are as noted in HPI. Constitutional and vital signs reviewed. All other systems reviewed and negative except as noted above.     Physica discussed this plan with the patient, she voices understanding and agrees with the plan of care. She left the clinic in stable condition. Red flag symptoms discussed.   Signs and symptoms/criteria that would necessitate reevaluation, including ER ev

## 2021-11-19 NOTE — ED INITIAL ASSESSMENT (HPI)
Pt sent here from [facility] with +FB in throat since 2200 yesterday, pt c/o pain with swallowing especially with drinking, no NIKKI or chest pain at this time.

## 2021-11-20 NOTE — CONSULTS
[address] [facility] Gastroenterology     Lakhwinder Avina Patient Status:  Emergency    1990 MRN [id number]   Location [address] Attending No att. providers found   [facility] Day # 0 PCP La 142/91   Pulse 79   Temp 98.2 °F (36.8 °C) (Oral)   Resp 18   Ht 5' 1\" (1.549 m)   Wt 174 lb (78.9 kg)   SpO2 100%   BMI 32.88 kg/m²   Body mass index is 32.88 kg/m². Gen: Awake and alert, NAD  CV: RRR, no m/r/g. Leg edema absent.   Resp: CTAB, no wheez risks and possible alternatives and their benefits and risks were discussed with the patient or the patient's surrogate.  The discussion of risks, not limited to but including bleeding, infection, perforation, adverse effects from anesthesia, and possible p

## 2021-11-28 ENCOUNTER — LAB ENCOUNTER (OUTPATIENT)
Dept: LAB | Facility: HOSPITAL | Age: 31
End: 2021-11-28
Attending: STUDENT IN AN ORGANIZED HEALTH CARE EDUCATION/TRAINING PROGRAM
Payer: COMMERCIAL

## 2021-11-28 DIAGNOSIS — Z01.818 PRE-OP TESTING: ICD-10-CM

## 2021-12-01 PROBLEM — R13.10 DYSPHAGIA: Status: ACTIVE | Noted: 2021-12-01

## 2021-12-06 RX ORDER — IBUPROFEN 800 MG/1
TABLET ORAL
Qty: 90 TABLET | Refills: 1 | Status: SHIPPED | OUTPATIENT
Start: 2021-12-06

## 2022-01-15 ENCOUNTER — LAB ENCOUNTER (OUTPATIENT)
Dept: LAB | Age: 32
End: 2022-01-15
Attending: STUDENT IN AN ORGANIZED HEALTH CARE EDUCATION/TRAINING PROGRAM
Payer: COMMERCIAL

## 2022-01-15 DIAGNOSIS — Z01.812 ENCOUNTER FOR PREPROCEDURE SCREENING LABORATORY TESTING FOR COVID-19: ICD-10-CM

## 2022-01-15 DIAGNOSIS — Z20.822 ENCOUNTER FOR PREPROCEDURE SCREENING LABORATORY TESTING FOR COVID-19: ICD-10-CM

## 2022-01-16 LAB — SARS-COV-2 RNA RESP QL NAA+PROBE: DETECTED

## 2022-01-17 DIAGNOSIS — Z20.822 ENCOUNTER FOR SCREENING LABORATORY TESTING FOR COVID-19 VIRUS: Primary | ICD-10-CM

## 2022-01-24 NOTE — PROGRESS NOTES
Elizabeth Merritt,    Your recent testing shows that you have the COVID infection. I recommend you follow-up with your primary care physician for further evaluation of this.  If you have any concerning symptoms including, but not limited to, chest pain, shortness of

## 2022-01-29 ENCOUNTER — LAB ENCOUNTER (OUTPATIENT)
Dept: LAB | Age: 32
End: 2022-01-29
Attending: PHYSICIAN ASSISTANT
Payer: COMMERCIAL

## 2022-01-29 DIAGNOSIS — Z20.822 ENCOUNTER FOR SCREENING LABORATORY TESTING FOR COVID-19 VIRUS: ICD-10-CM

## 2022-01-30 LAB — SARS-COV-2 RNA RESP QL NAA+PROBE: NOT DETECTED

## 2022-01-31 RX ORDER — BUPROPION HYDROCHLORIDE 200 MG/1
TABLET, EXTENDED RELEASE ORAL
Qty: 180 TABLET | Refills: 0 | Status: SHIPPED | OUTPATIENT
Start: 2022-01-31

## 2022-02-01 ENCOUNTER — HOSPITAL ENCOUNTER (OUTPATIENT)
Dept: GENERAL RADIOLOGY | Facility: HOSPITAL | Age: 32
Discharge: HOME | End: 2022-02-01
Attending: STUDENT IN AN ORGANIZED HEALTH CARE EDUCATION/TRAINING PROGRAM
Payer: COMMERCIAL

## 2022-02-01 DIAGNOSIS — R09.89 GLOBUS SENSATION: ICD-10-CM

## 2022-02-01 DIAGNOSIS — R12 HEARTBURN: ICD-10-CM

## 2022-02-01 PROCEDURE — 74246 X-RAY XM UPR GI TRC 2CNTRST: CPT | Performed by: STUDENT IN AN ORGANIZED HEALTH CARE EDUCATION/TRAINING PROGRAM

## 2022-02-04 NOTE — PROGRESS NOTES
Maris Chi,    Your recent esophogram study shows that you have a \"small\" hiatal hernia. In hiatal hernia, part of the stomach pushes into the chest cavity. However, please note that you also had an upper endoscopy completed before, and the upper endoscopy is actually the best test for this type of evaluation. The upper endoscopy did NOT show any hernia. Please follow-up with Dr Saran Christensen as scheduled for further evaluation of your small intestine. Please let me know if you have any questions or concerns.      Sincerely,  Diogo Craft MD

## 2022-02-10 ENCOUNTER — OFFICE VISIT (OUTPATIENT)
Dept: FAMILY MEDICINE CLINIC | Facility: CLINIC | Age: 32
End: 2022-02-10
Payer: COMMERCIAL

## 2022-02-10 VITALS
SYSTOLIC BLOOD PRESSURE: 126 MMHG | HEART RATE: 78 BPM | HEIGHT: 61 IN | RESPIRATION RATE: 18 BRPM | OXYGEN SATURATION: 99 % | DIASTOLIC BLOOD PRESSURE: 80 MMHG | WEIGHT: 176 LBS | BODY MASS INDEX: 33.23 KG/M2

## 2022-02-10 DIAGNOSIS — R61 NIGHT SWEATS: ICD-10-CM

## 2022-02-10 DIAGNOSIS — F39 MOOD DISORDER (HCC): ICD-10-CM

## 2022-02-10 DIAGNOSIS — E66.3 OVERWEIGHT (BMI 25.0-29.9): ICD-10-CM

## 2022-02-10 DIAGNOSIS — Z00.00 ROUTINE GENERAL MEDICAL EXAMINATION AT A HEALTH CARE FACILITY: Primary | ICD-10-CM

## 2022-02-10 DIAGNOSIS — R01.1 SYSTOLIC MURMUR: ICD-10-CM

## 2022-02-10 DIAGNOSIS — R07.89 ATYPICAL CHEST PAIN: ICD-10-CM

## 2022-02-10 PROCEDURE — 3008F BODY MASS INDEX DOCD: CPT | Performed by: PHYSICIAN ASSISTANT

## 2022-02-10 PROCEDURE — 3074F SYST BP LT 130 MM HG: CPT | Performed by: PHYSICIAN ASSISTANT

## 2022-02-10 PROCEDURE — 3079F DIAST BP 80-89 MM HG: CPT | Performed by: PHYSICIAN ASSISTANT

## 2022-02-10 PROCEDURE — 99395 PREV VISIT EST AGE 18-39: CPT | Performed by: PHYSICIAN ASSISTANT

## 2022-02-10 RX ORDER — GUANFACINE 2 MG/1
2 TABLET ORAL NIGHTLY
Qty: 90 TABLET | Refills: 1 | Status: SHIPPED | OUTPATIENT
Start: 2022-02-10 | End: 2022-03-02

## 2022-02-10 RX ORDER — LISDEXAMFETAMINE DIMESYLATE 10 MG/1
10 CAPSULE ORAL DAILY
Qty: 30 CAPSULE | Refills: 0 | Status: SHIPPED | OUTPATIENT
Start: 2022-02-10 | End: 2022-02-26

## 2022-02-10 RX ORDER — TIZANIDINE 4 MG/1
4 TABLET ORAL EVERY 8 HOURS PRN
Qty: 30 TABLET | Refills: 2 | Status: SHIPPED | OUTPATIENT
Start: 2022-02-10

## 2022-02-16 ENCOUNTER — HOSPITAL ENCOUNTER (EMERGENCY)
Facility: HOSPITAL | Age: 32
Discharge: HOME | End: 2022-02-16
Attending: EMERGENCY MEDICINE
Payer: COMMERCIAL

## 2022-02-16 ENCOUNTER — APPOINTMENT (OUTPATIENT)
Dept: GENERAL RADIOLOGY | Facility: HOSPITAL | Age: 32
End: 2022-02-16
Attending: EMERGENCY MEDICINE
Payer: COMMERCIAL

## 2022-02-16 ENCOUNTER — APPOINTMENT (OUTPATIENT)
Dept: CT IMAGING | Facility: HOSPITAL | Age: 32
End: 2022-02-16
Attending: EMERGENCY MEDICINE
Payer: COMMERCIAL

## 2022-02-16 VITALS
SYSTOLIC BLOOD PRESSURE: 118 MMHG | WEIGHT: 174 LBS | DIASTOLIC BLOOD PRESSURE: 79 MMHG | BODY MASS INDEX: 32.85 KG/M2 | HEIGHT: 61 IN | RESPIRATION RATE: 16 BRPM | TEMPERATURE: 98 F | HEART RATE: 79 BPM | OXYGEN SATURATION: 96 %

## 2022-02-16 DIAGNOSIS — I49.3 PVC (PREMATURE VENTRICULAR CONTRACTION): ICD-10-CM

## 2022-02-16 DIAGNOSIS — R07.89 CHEST PAIN, ATYPICAL: Primary | ICD-10-CM

## 2022-02-16 LAB
ALBUMIN SERPL-MCNC: 3.7 G/DL (ref 3.4–5)
ALBUMIN/GLOB SERPL: 1 {RATIO} (ref 1–2)
ALP LIVER SERPL-CCNC: 66 U/L
ALT SERPL-CCNC: 21 U/L
ANION GAP SERPL CALC-SCNC: 4 MMOL/L (ref 0–18)
AST SERPL-CCNC: 15 U/L (ref 15–37)
B-HCG UR QL: NEGATIVE
BASOPHILS # BLD AUTO: 0.05 X10(3) UL (ref 0–0.2)
BASOPHILS NFR BLD AUTO: 0.5 %
BILIRUB SERPL-MCNC: 0.2 MG/DL (ref 0.1–2)
BUN BLD-MCNC: 8 MG/DL (ref 7–18)
CALCIUM BLD-MCNC: 9.1 MG/DL (ref 8.5–10.1)
CHLORIDE SERPL-SCNC: 108 MMOL/L (ref 98–112)
CO2 SERPL-SCNC: 27 MMOL/L (ref 21–32)
CREAT BLD-MCNC: 0.81 MG/DL
D DIMER PPP FEU-MCNC: 0.65 UG/ML FEU (ref ?–0.5)
EOSINOPHIL # BLD AUTO: 0.16 X10(3) UL (ref 0–0.7)
EOSINOPHIL NFR BLD AUTO: 1.5 %
ERYTHROCYTE [DISTWIDTH] IN BLOOD BY AUTOMATED COUNT: 13.2 %
GLOBULIN PLAS-MCNC: 3.7 G/DL (ref 2.8–4.4)
GLUCOSE BLD-MCNC: 97 MG/DL (ref 70–99)
HCT VFR BLD AUTO: 39.5 %
HGB BLD-MCNC: 13.7 G/DL
IMM GRANULOCYTES # BLD AUTO: 0.03 X10(3) UL (ref 0–1)
IMM GRANULOCYTES NFR BLD: 0.3 %
LYMPHOCYTES # BLD AUTO: 2.1 X10(3) UL (ref 1–4)
LYMPHOCYTES NFR BLD AUTO: 19.9 %
MCH RBC QN AUTO: 30.4 PG (ref 26–34)
MCHC RBC AUTO-ENTMCNC: 34.7 G/DL (ref 31–37)
MCV RBC AUTO: 87.6 FL
MONOCYTES # BLD AUTO: 0.65 X10(3) UL (ref 0.1–1)
MONOCYTES NFR BLD AUTO: 6.2 %
NEUTROPHILS # BLD AUTO: 7.55 X10 (3) UL (ref 1.5–7.7)
NEUTROPHILS # BLD AUTO: 7.55 X10(3) UL (ref 1.5–7.7)
NEUTROPHILS NFR BLD AUTO: 71.6 %
OSMOLALITY SERPL CALC.SUM OF ELEC: 286 MOSM/KG (ref 275–295)
PLATELET # BLD AUTO: 287 10(3)UL (ref 150–450)
POTASSIUM SERPL-SCNC: 3.6 MMOL/L (ref 3.5–5.1)
PROT SERPL-MCNC: 7.4 G/DL (ref 6.4–8.2)
RBC # BLD AUTO: 4.51 X10(6)UL
SODIUM SERPL-SCNC: 139 MMOL/L (ref 136–145)
TROPONIN I HIGH SENSITIVITY: 18 NG/L
TSI SER-ACNC: 1.97 MIU/ML (ref 0.36–3.74)
WBC # BLD AUTO: 10.5 X10(3) UL (ref 4–11)

## 2022-02-16 PROCEDURE — 81025 URINE PREGNANCY TEST: CPT

## 2022-02-16 PROCEDURE — 99285 EMERGENCY DEPT VISIT HI MDM: CPT

## 2022-02-16 PROCEDURE — 84484 ASSAY OF TROPONIN QUANT: CPT | Performed by: EMERGENCY MEDICINE

## 2022-02-16 PROCEDURE — 93005 ELECTROCARDIOGRAM TRACING: CPT

## 2022-02-16 PROCEDURE — 99284 EMERGENCY DEPT VISIT MOD MDM: CPT

## 2022-02-16 PROCEDURE — 93010 ELECTROCARDIOGRAM REPORT: CPT

## 2022-02-16 PROCEDURE — 71045 X-RAY EXAM CHEST 1 VIEW: CPT | Performed by: EMERGENCY MEDICINE

## 2022-02-16 PROCEDURE — 84443 ASSAY THYROID STIM HORMONE: CPT | Performed by: EMERGENCY MEDICINE

## 2022-02-16 PROCEDURE — 85379 FIBRIN DEGRADATION QUANT: CPT | Performed by: EMERGENCY MEDICINE

## 2022-02-16 PROCEDURE — 36415 COLL VENOUS BLD VENIPUNCTURE: CPT

## 2022-02-16 PROCEDURE — 71275 CT ANGIOGRAPHY CHEST: CPT | Performed by: EMERGENCY MEDICINE

## 2022-02-16 PROCEDURE — 85025 COMPLETE CBC W/AUTO DIFF WBC: CPT | Performed by: EMERGENCY MEDICINE

## 2022-02-16 PROCEDURE — 80053 COMPREHEN METABOLIC PANEL: CPT | Performed by: EMERGENCY MEDICINE

## 2022-02-16 RX ORDER — IOHEXOL 350 MG/ML
100 INJECTION, SOLUTION INTRAVENOUS
Status: COMPLETED | OUTPATIENT
Start: 2022-02-16 | End: 2022-02-16

## 2022-02-17 LAB
ATRIAL RATE: 100 BPM
P AXIS: 49 DEGREES
P-R INTERVAL: 170 MS
Q-T INTERVAL: 342 MS
QRS DURATION: 80 MS
R AXIS: 11 DEGREES
T AXIS: 53 DEGREES
VENTRICULAR RATE: 100 BPM

## 2022-02-17 NOTE — ED INITIAL ASSESSMENT (HPI)
Pt reports intermittent chest pain since last night. Pt reports left sided pain. Pt took phentermine 15mg yesterday. Pt had a physical last week.

## 2022-03-03 PROBLEM — K83.8 AMPULLA OF VATER MASS: Status: ACTIVE | Noted: 2022-03-03

## 2022-04-07 ENCOUNTER — TELEMEDICINE (OUTPATIENT)
Dept: FAMILY MEDICINE CLINIC | Facility: CLINIC | Age: 32
End: 2022-04-07
Payer: COMMERCIAL

## 2022-04-07 DIAGNOSIS — M51.34 DDD (DEGENERATIVE DISC DISEASE), THORACIC: Primary | ICD-10-CM

## 2022-04-07 DIAGNOSIS — E66.3 OVERWEIGHT (BMI 25.0-29.9): ICD-10-CM

## 2022-04-07 DIAGNOSIS — F39 MOOD DISORDER (HCC): ICD-10-CM

## 2022-04-07 PROCEDURE — 99214 OFFICE O/P EST MOD 30 MIN: CPT | Performed by: PHYSICIAN ASSISTANT

## 2022-04-07 RX ORDER — BUPROPION HYDROCHLORIDE 200 MG/1
200 TABLET, EXTENDED RELEASE ORAL 2 TIMES DAILY
Qty: 180 TABLET | Refills: 1 | Status: SHIPPED | OUTPATIENT
Start: 2022-04-07

## 2022-04-07 RX ORDER — PREDNISONE 10 MG/1
TABLET ORAL
Qty: 18 TABLET | Refills: 0 | Status: SHIPPED | OUTPATIENT
Start: 2022-04-07

## 2022-04-14 RX ORDER — CEFDINIR 300 MG/1
300 CAPSULE ORAL 2 TIMES DAILY
Qty: 20 CAPSULE | Refills: 0 | Status: SHIPPED | OUTPATIENT
Start: 2022-04-14 | End: 2022-04-24

## 2022-05-27 RX ORDER — GUANFACINE 2 MG/1
TABLET, EXTENDED RELEASE ORAL
Qty: 90 TABLET | Refills: 0 | Status: SHIPPED | OUTPATIENT
Start: 2022-05-27

## 2022-05-31 ENCOUNTER — LAB ENCOUNTER (OUTPATIENT)
Dept: LAB | Facility: HOSPITAL | Age: 32
End: 2022-05-31
Attending: INTERNAL MEDICINE
Payer: COMMERCIAL

## 2022-05-31 DIAGNOSIS — Z01.812 ENCOUNTER FOR PREOPERATIVE SCREENING LABORATORY TESTING FOR COVID-19 VIRUS: ICD-10-CM

## 2022-05-31 DIAGNOSIS — Z20.822 ENCOUNTER FOR PREOPERATIVE SCREENING LABORATORY TESTING FOR COVID-19 VIRUS: ICD-10-CM

## 2022-06-01 LAB — SARS-COV-2 RNA RESP QL NAA+PROBE: NOT DETECTED

## 2022-06-02 ENCOUNTER — ANESTHESIA EVENT (OUTPATIENT)
Dept: ENDOSCOPY | Facility: HOSPITAL | Age: 32
End: 2022-06-02
Payer: COMMERCIAL

## 2022-06-02 ENCOUNTER — HOSPITAL ENCOUNTER (OUTPATIENT)
Facility: HOSPITAL | Age: 32
Setting detail: HOSPITAL OUTPATIENT SURGERY
Discharge: HOME | End: 2022-06-02
Attending: INTERNAL MEDICINE | Admitting: INTERNAL MEDICINE
Payer: COMMERCIAL

## 2022-06-02 ENCOUNTER — ANESTHESIA (OUTPATIENT)
Dept: ENDOSCOPY | Facility: HOSPITAL | Age: 32
End: 2022-06-02
Payer: COMMERCIAL

## 2022-06-02 VITALS
SYSTOLIC BLOOD PRESSURE: 91 MMHG | HEART RATE: 90 BPM | HEIGHT: 61.5 IN | TEMPERATURE: 98 F | RESPIRATION RATE: 16 BRPM | BODY MASS INDEX: 32.24 KG/M2 | OXYGEN SATURATION: 100 % | DIASTOLIC BLOOD PRESSURE: 63 MMHG | WEIGHT: 173 LBS

## 2022-06-02 DIAGNOSIS — Z01.812 ENCOUNTER FOR PREOPERATIVE SCREENING LABORATORY TESTING FOR COVID-19 VIRUS: Primary | ICD-10-CM

## 2022-06-02 DIAGNOSIS — Z20.822 ENCOUNTER FOR PREOPERATIVE SCREENING LABORATORY TESTING FOR COVID-19 VIRUS: Primary | ICD-10-CM

## 2022-06-02 DIAGNOSIS — K83.8 AMPULLA OF VATER MASS: ICD-10-CM

## 2022-06-02 LAB — B-HCG UR QL: NEGATIVE

## 2022-06-02 PROCEDURE — 0DJ08ZZ INSPECTION OF UPPER INTESTINAL TRACT, VIA NATURAL OR ARTIFICIAL OPENING ENDOSCOPIC: ICD-10-PCS | Performed by: INTERNAL MEDICINE

## 2022-06-02 PROCEDURE — BD47ZZZ ULTRASONOGRAPHY OF GASTROINTESTINAL TRACT: ICD-10-PCS | Performed by: INTERNAL MEDICINE

## 2022-06-02 PROCEDURE — 81025 URINE PREGNANCY TEST: CPT

## 2022-06-02 RX ORDER — LIDOCAINE HYDROCHLORIDE 10 MG/ML
INJECTION, SOLUTION EPIDURAL; INFILTRATION; INTRACAUDAL; PERINEURAL AS NEEDED
Status: DISCONTINUED | OUTPATIENT
Start: 2022-06-02 | End: 2022-06-02 | Stop reason: SURG

## 2022-06-02 RX ORDER — SODIUM CHLORIDE, SODIUM LACTATE, POTASSIUM CHLORIDE, CALCIUM CHLORIDE 600; 310; 30; 20 MG/100ML; MG/100ML; MG/100ML; MG/100ML
INJECTION, SOLUTION INTRAVENOUS CONTINUOUS
Status: DISCONTINUED | OUTPATIENT
Start: 2022-06-02 | End: 2022-06-02

## 2022-06-02 RX ADMIN — SODIUM CHLORIDE, SODIUM LACTATE, POTASSIUM CHLORIDE, CALCIUM CHLORIDE: 600; 310; 30; 20 INJECTION, SOLUTION INTRAVENOUS at 13:02:00

## 2022-06-02 RX ADMIN — LIDOCAINE HYDROCHLORIDE 25 MG: 10 INJECTION, SOLUTION EPIDURAL; INFILTRATION; INTRACAUDAL; PERINEURAL at 13:07:00

## 2022-07-06 ENCOUNTER — OFFICE VISIT (OUTPATIENT)
Dept: FAMILY MEDICINE CLINIC | Facility: CLINIC | Age: 32
End: 2022-07-06
Payer: COMMERCIAL

## 2022-07-06 VITALS
TEMPERATURE: 98 F | HEART RATE: 58 BPM | BODY MASS INDEX: 32.66 KG/M2 | SYSTOLIC BLOOD PRESSURE: 110 MMHG | WEIGHT: 173 LBS | OXYGEN SATURATION: 99 % | HEIGHT: 61 IN | DIASTOLIC BLOOD PRESSURE: 56 MMHG

## 2022-07-06 DIAGNOSIS — R10.13 EPIGASTRIC CRAMPING: Primary | ICD-10-CM

## 2022-07-06 PROCEDURE — 3074F SYST BP LT 130 MM HG: CPT | Performed by: NURSE PRACTITIONER

## 2022-07-06 PROCEDURE — 3078F DIAST BP <80 MM HG: CPT | Performed by: NURSE PRACTITIONER

## 2022-07-06 PROCEDURE — 3008F BODY MASS INDEX DOCD: CPT | Performed by: NURSE PRACTITIONER

## 2022-07-06 PROCEDURE — 99213 OFFICE O/P EST LOW 20 MIN: CPT | Performed by: NURSE PRACTITIONER

## 2022-07-06 RX ORDER — DICYCLOMINE HYDROCHLORIDE 10 MG/1
10 CAPSULE ORAL
Qty: 6 CAPSULE | Refills: 0 | Status: SHIPPED | OUTPATIENT
Start: 2022-07-06 | End: 2022-07-08

## 2022-08-17 NOTE — MR AVS SNAPSHOT
CC:  Trena Guzman   Chief Complaint   Patient presents with   • Office Visit     Post op right TKA DOS:22 EO22      Referring MD: Isai Gonsalves MD  PCP: TANIA Riley  Medications: medications verified and updated  Refills needed today?  Yes, Please advise wants to get off Oxycodone and wants something else instead for pain.   denies known Latex allergy or symptoms of Latex sensitivity.  Patient would like communication of their results via:  [employer]  Tobacco history: verified  BMI: There is no height or weight on file to calculate BMI.                    MATTHEW [facility]  [address] [facility] [address]  [phone]               Thank you for choosing us for your health care visit with Jere Thomson.   We are glad to serve you and happy to provide you with this summary tongue tie/upper lip tie. F/U with Lactation recommended. Guidelines for Using a Nipple Shield    Refer to the [employer]. These are additional suggestions only. ?  This thin silicone nipple shield (size 20mm) has been recommended to a to try to reattach your baby to your breast without the shield. ?  When your baby’s swallowing slows on the first side, repeat this process on the other breast.   ? If needed, trickle drops of your expressed milk or formula onto the nipple to encourage you ? Follow-up visits with your lactation consultant and baby’s health care provider are necessary when using the shield. ? Your baby’s weight should be checked regularly while using a nipple shield.            Allergies as of Jun 12, 2017     No Known Aller

## 2022-08-24 ENCOUNTER — TELEMEDICINE (OUTPATIENT)
Dept: FAMILY MEDICINE CLINIC | Facility: CLINIC | Age: 32
End: 2022-08-24

## 2022-08-24 DIAGNOSIS — E66.3 OVERWEIGHT (BMI 25.0-29.9): Primary | ICD-10-CM

## 2022-08-24 PROCEDURE — 99213 OFFICE O/P EST LOW 20 MIN: CPT | Performed by: PHYSICIAN ASSISTANT

## 2022-10-13 ENCOUNTER — PATIENT MESSAGE (OUTPATIENT)
Dept: FAMILY MEDICINE CLINIC | Facility: CLINIC | Age: 32
End: 2022-10-13

## 2022-10-13 DIAGNOSIS — M51.34 DDD (DEGENERATIVE DISC DISEASE), THORACIC: Primary | ICD-10-CM

## 2022-10-14 RX ORDER — TRAMADOL HYDROCHLORIDE 50 MG/1
50 TABLET ORAL EVERY 6 HOURS PRN
Qty: 30 TABLET | Refills: 1 | Status: SHIPPED | OUTPATIENT
Start: 2022-10-14

## 2022-10-14 RX ORDER — PREDNISONE 20 MG/1
40 TABLET ORAL DAILY
Qty: 10 TABLET | Refills: 0 | Status: SHIPPED | OUTPATIENT
Start: 2022-10-14 | End: 2022-10-19

## 2022-10-28 RX ORDER — TIZANIDINE 4 MG/1
4 TABLET ORAL EVERY 8 HOURS PRN
Qty: 30 TABLET | Refills: 2 | Status: SHIPPED | OUTPATIENT
Start: 2022-10-28

## 2022-11-17 ENCOUNTER — APPOINTMENT (OUTPATIENT)
Dept: LAB | Facility: HOSPITAL | Age: 32
End: 2022-11-17
Attending: PREVENTIVE MEDICINE

## 2022-12-07 ENCOUNTER — HOSPITAL ENCOUNTER (OUTPATIENT)
Dept: CV DIAGNOSTICS | Age: 32
Discharge: HOME | End: 2022-12-07
Attending: PHYSICIAN ASSISTANT
Payer: COMMERCIAL

## 2022-12-07 DIAGNOSIS — R07.89 ATYPICAL CHEST PAIN: ICD-10-CM

## 2022-12-07 DIAGNOSIS — R01.1 SYSTOLIC MURMUR: ICD-10-CM

## 2022-12-07 PROCEDURE — 93306 TTE W/DOPPLER COMPLETE: CPT | Performed by: PHYSICIAN ASSISTANT

## 2022-12-23 RX ORDER — DEXTROAMPHETAMINE SACCHARATE, AMPHETAMINE ASPARTATE, DEXTROAMPHETAMINE SULFATE AND AMPHETAMINE SULFATE 1.25; 1.25; 1.25; 1.25 MG/1; MG/1; MG/1; MG/1
5 TABLET ORAL DAILY
Qty: 30 TABLET | Refills: 0 | Status: SHIPPED | OUTPATIENT
Start: 2023-01-23 | End: 2023-02-22

## 2022-12-23 RX ORDER — DEXTROAMPHETAMINE SACCHARATE, AMPHETAMINE ASPARTATE, DEXTROAMPHETAMINE SULFATE AND AMPHETAMINE SULFATE 1.25; 1.25; 1.25; 1.25 MG/1; MG/1; MG/1; MG/1
5 TABLET ORAL DAILY
Qty: 30 TABLET | Refills: 0 | Status: SHIPPED | OUTPATIENT
Start: 2023-02-23 | End: 2023-03-25

## 2022-12-23 RX ORDER — DEXTROAMPHETAMINE SACCHARATE, AMPHETAMINE ASPARTATE MONOHYDRATE, DEXTROAMPHETAMINE SULFATE AND AMPHETAMINE SULFATE 6.25; 6.25; 6.25; 6.25 MG/1; MG/1; MG/1; MG/1
25 CAPSULE, EXTENDED RELEASE ORAL DAILY
Qty: 30 CAPSULE | Refills: 0 | Status: SHIPPED | OUTPATIENT
Start: 2023-01-23 | End: 2023-02-23

## 2022-12-23 RX ORDER — DEXTROAMPHETAMINE SACCHARATE, AMPHETAMINE ASPARTATE MONOHYDRATE, DEXTROAMPHETAMINE SULFATE AND AMPHETAMINE SULFATE 6.25; 6.25; 6.25; 6.25 MG/1; MG/1; MG/1; MG/1
25 CAPSULE, EXTENDED RELEASE ORAL DAILY
Qty: 30 CAPSULE | Refills: 0 | Status: SHIPPED | OUTPATIENT
Start: 2023-02-23 | End: 2023-03-25

## 2022-12-23 RX ORDER — DEXTROAMPHETAMINE SACCHARATE, AMPHETAMINE ASPARTATE, DEXTROAMPHETAMINE SULFATE AND AMPHETAMINE SULFATE 1.25; 1.25; 1.25; 1.25 MG/1; MG/1; MG/1; MG/1
5 TABLET ORAL DAILY
Qty: 30 TABLET | Refills: 0 | Status: SHIPPED | OUTPATIENT
Start: 2022-12-23 | End: 2023-01-22

## 2022-12-23 RX ORDER — DEXTROAMPHETAMINE SACCHARATE, AMPHETAMINE ASPARTATE MONOHYDRATE, DEXTROAMPHETAMINE SULFATE AND AMPHETAMINE SULFATE 6.25; 6.25; 6.25; 6.25 MG/1; MG/1; MG/1; MG/1
25 CAPSULE, EXTENDED RELEASE ORAL DAILY
Qty: 30 CAPSULE | Refills: 0 | Status: SHIPPED | OUTPATIENT
Start: 2022-12-23 | End: 2023-01-22

## 2023-02-23 ENCOUNTER — PATIENT MESSAGE (OUTPATIENT)
Dept: FAMILY MEDICINE CLINIC | Facility: CLINIC | Age: 33
End: 2023-02-23

## 2023-02-23 NOTE — TELEPHONE ENCOUNTER
From: Lencho Saldana  To: Nuvia Goldstein PA-C  Sent: 2/23/2023 9:21 AM CST  Subject: Medication refill     Hi Viraj ,   Hope you are doing well ,     I wanted to let you know I have no longer had the palpitations , even with physical activity, I no longer get the flutter feeling in the chest very often , I also monitor Heart rate . We also spoke on dosage of adderall last ov and possibly going up on the afternoon dose if we needed to. Lately I have been taking 10 mg in the afternoon and I feel like it has helped more , I was hoping to get a prescription for the 10 mg . Also just wanted to know how often you would like for me to follow up with you .      Thank you ,     Hilario Noyola

## 2023-02-23 NOTE — TELEPHONE ENCOUNTER
Please see patient message, I did see it was refilled today.  When would you like to see Trino Restrepo for a follow up

## 2023-02-24 RX ORDER — DEXTROAMPHETAMINE SACCHARATE, AMPHETAMINE ASPARTATE, DEXTROAMPHETAMINE SULFATE AND AMPHETAMINE SULFATE 2.5; 2.5; 2.5; 2.5 MG/1; MG/1; MG/1; MG/1
10 TABLET ORAL DAILY
Qty: 30 TABLET | Refills: 0 | Status: SHIPPED | OUTPATIENT
Start: 2023-02-24 | End: 2023-03-26

## 2023-02-24 RX ORDER — DEXTROAMPHETAMINE SACCHARATE, AMPHETAMINE ASPARTATE, DEXTROAMPHETAMINE SULFATE AND AMPHETAMINE SULFATE 2.5; 2.5; 2.5; 2.5 MG/1; MG/1; MG/1; MG/1
10 TABLET ORAL DAILY
Qty: 30 TABLET | Refills: 0 | Status: SHIPPED | OUTPATIENT
Start: 2023-03-27 | End: 2023-04-26

## 2023-02-24 RX ORDER — DEXTROAMPHETAMINE SACCHARATE, AMPHETAMINE ASPARTATE, DEXTROAMPHETAMINE SULFATE AND AMPHETAMINE SULFATE 2.5; 2.5; 2.5; 2.5 MG/1; MG/1; MG/1; MG/1
10 TABLET ORAL DAILY
Qty: 30 TABLET | Refills: 0 | Status: SHIPPED | OUTPATIENT
Start: 2023-04-27 | End: 2023-05-27

## 2023-02-28 ENCOUNTER — PATIENT MESSAGE (OUTPATIENT)
Dept: FAMILY MEDICINE CLINIC | Facility: CLINIC | Age: 33
End: 2023-02-28

## 2023-03-03 ENCOUNTER — PATIENT MESSAGE (OUTPATIENT)
Dept: FAMILY MEDICINE CLINIC | Facility: CLINIC | Age: 33
End: 2023-03-03

## 2023-06-16 ENCOUNTER — OFFICE VISIT (OUTPATIENT)
Dept: FAMILY MEDICINE CLINIC | Facility: CLINIC | Age: 33
End: 2023-06-16
Payer: COMMERCIAL

## 2023-06-16 ENCOUNTER — LAB ENCOUNTER (OUTPATIENT)
Dept: LAB | Age: 33
End: 2023-06-16
Attending: FAMILY MEDICINE
Payer: COMMERCIAL

## 2023-06-16 VITALS
DIASTOLIC BLOOD PRESSURE: 80 MMHG | SYSTOLIC BLOOD PRESSURE: 110 MMHG | WEIGHT: 166 LBS | RESPIRATION RATE: 18 BRPM | HEIGHT: 61 IN | HEART RATE: 89 BPM | OXYGEN SATURATION: 98 % | BODY MASS INDEX: 31.34 KG/M2

## 2023-06-16 DIAGNOSIS — Z00.00 ANNUAL PHYSICAL EXAM: Primary | ICD-10-CM

## 2023-06-16 DIAGNOSIS — F39 MOOD DISORDER (HCC): ICD-10-CM

## 2023-06-16 DIAGNOSIS — F90.2 ATTENTION DEFICIT HYPERACTIVITY DISORDER (ADHD), COMBINED TYPE: ICD-10-CM

## 2023-06-16 DIAGNOSIS — Z00.00 ANNUAL PHYSICAL EXAM: ICD-10-CM

## 2023-06-16 DIAGNOSIS — I35.1 AORTIC VALVE INSUFFICIENCY, ETIOLOGY OF CARDIAC VALVE DISEASE UNSPECIFIED: ICD-10-CM

## 2023-06-16 DIAGNOSIS — R06.83 SNORING: ICD-10-CM

## 2023-06-16 LAB
ALBUMIN SERPL-MCNC: 4 G/DL (ref 3.4–5)
ALBUMIN/GLOB SERPL: 1.2 {RATIO} (ref 1–2)
ALP LIVER SERPL-CCNC: 50 U/L
ALT SERPL-CCNC: 24 U/L
ANION GAP SERPL CALC-SCNC: 5 MMOL/L (ref 0–18)
AST SERPL-CCNC: 22 U/L (ref 15–37)
BASOPHILS # BLD AUTO: 0.04 X10(3) UL (ref 0–0.2)
BASOPHILS NFR BLD AUTO: 0.6 %
BILIRUB SERPL-MCNC: 0.4 MG/DL (ref 0.1–2)
BUN BLD-MCNC: 12 MG/DL (ref 7–18)
CALCIUM BLD-MCNC: 8.9 MG/DL (ref 8.5–10.1)
CHLORIDE SERPL-SCNC: 107 MMOL/L (ref 98–112)
CHOLEST SERPL-MCNC: 171 MG/DL (ref ?–200)
CO2 SERPL-SCNC: 26 MMOL/L (ref 21–32)
CREAT BLD-MCNC: 0.77 MG/DL
EOSINOPHIL # BLD AUTO: 0.07 X10(3) UL (ref 0–0.7)
EOSINOPHIL NFR BLD AUTO: 1.1 %
ERYTHROCYTE [DISTWIDTH] IN BLOOD BY AUTOMATED COUNT: 12.8 %
EST. AVERAGE GLUCOSE BLD GHB EST-MCNC: 103 MG/DL (ref 68–126)
FASTING PATIENT LIPID ANSWER: YES
FASTING STATUS PATIENT QL REPORTED: YES
GFR SERPLBLD BASED ON 1.73 SQ M-ARVRAT: 104 ML/MIN/1.73M2 (ref 60–?)
GLOBULIN PLAS-MCNC: 3.4 G/DL (ref 2.8–4.4)
GLUCOSE BLD-MCNC: 81 MG/DL (ref 70–99)
HBA1C MFR BLD: 5.2 % (ref ?–5.7)
HCT VFR BLD AUTO: 42.9 %
HDLC SERPL-MCNC: 59 MG/DL (ref 40–59)
HGB BLD-MCNC: 14 G/DL
IMM GRANULOCYTES # BLD AUTO: 0.02 X10(3) UL (ref 0–1)
IMM GRANULOCYTES NFR BLD: 0.3 %
LDLC SERPL CALC-MCNC: 98 MG/DL (ref ?–100)
LYMPHOCYTES # BLD AUTO: 1.84 X10(3) UL (ref 1–4)
LYMPHOCYTES NFR BLD AUTO: 29.6 %
MCH RBC QN AUTO: 30.1 PG (ref 26–34)
MCHC RBC AUTO-ENTMCNC: 32.6 G/DL (ref 31–37)
MCV RBC AUTO: 92.3 FL
MONOCYTES # BLD AUTO: 0.42 X10(3) UL (ref 0.1–1)
MONOCYTES NFR BLD AUTO: 6.8 %
NEUTROPHILS # BLD AUTO: 3.83 X10 (3) UL (ref 1.5–7.7)
NEUTROPHILS # BLD AUTO: 3.83 X10(3) UL (ref 1.5–7.7)
NEUTROPHILS NFR BLD AUTO: 61.6 %
NONHDLC SERPL-MCNC: 112 MG/DL (ref ?–130)
OSMOLALITY SERPL CALC.SUM OF ELEC: 285 MOSM/KG (ref 275–295)
PLATELET # BLD AUTO: 300 10(3)UL (ref 150–450)
POTASSIUM SERPL-SCNC: 4.3 MMOL/L (ref 3.5–5.1)
PROT SERPL-MCNC: 7.4 G/DL (ref 6.4–8.2)
RBC # BLD AUTO: 4.65 X10(6)UL
SODIUM SERPL-SCNC: 138 MMOL/L (ref 136–145)
T4 FREE SERPL-MCNC: 0.9 NG/DL (ref 0.8–1.7)
TRIGL SERPL-MCNC: 77 MG/DL (ref 30–149)
TSI SER-ACNC: 1.67 MIU/ML (ref 0.36–3.74)
VIT B12 SERPL-MCNC: 658 PG/ML (ref 193–986)
VIT D+METAB SERPL-MCNC: 29.6 NG/ML (ref 30–100)
VLDLC SERPL CALC-MCNC: 13 MG/DL (ref 0–30)
WBC # BLD AUTO: 6.2 X10(3) UL (ref 4–11)

## 2023-06-16 PROCEDURE — 3079F DIAST BP 80-89 MM HG: CPT | Performed by: FAMILY MEDICINE

## 2023-06-16 PROCEDURE — 82607 VITAMIN B-12: CPT | Performed by: FAMILY MEDICINE

## 2023-06-16 PROCEDURE — 84439 ASSAY OF FREE THYROXINE: CPT | Performed by: FAMILY MEDICINE

## 2023-06-16 PROCEDURE — 3008F BODY MASS INDEX DOCD: CPT | Performed by: FAMILY MEDICINE

## 2023-06-16 PROCEDURE — 83036 HEMOGLOBIN GLYCOSYLATED A1C: CPT | Performed by: FAMILY MEDICINE

## 2023-06-16 PROCEDURE — 80061 LIPID PANEL: CPT | Performed by: FAMILY MEDICINE

## 2023-06-16 PROCEDURE — 99395 PREV VISIT EST AGE 18-39: CPT | Performed by: FAMILY MEDICINE

## 2023-06-16 PROCEDURE — 80050 GENERAL HEALTH PANEL: CPT | Performed by: FAMILY MEDICINE

## 2023-06-16 PROCEDURE — 99213 OFFICE O/P EST LOW 20 MIN: CPT | Performed by: FAMILY MEDICINE

## 2023-06-16 PROCEDURE — 3074F SYST BP LT 130 MM HG: CPT | Performed by: FAMILY MEDICINE

## 2023-06-16 PROCEDURE — 82306 VITAMIN D 25 HYDROXY: CPT | Performed by: FAMILY MEDICINE

## 2023-06-16 RX ORDER — DEXTROAMPHETAMINE SACCHARATE, AMPHETAMINE ASPARTATE, DEXTROAMPHETAMINE SULFATE AND AMPHETAMINE SULFATE 2.5; 2.5; 2.5; 2.5 MG/1; MG/1; MG/1; MG/1
10 TABLET ORAL DAILY
Qty: 30 TABLET | Refills: 0 | Status: SHIPPED | OUTPATIENT
Start: 2023-07-17 | End: 2023-06-16

## 2023-06-16 RX ORDER — THEANINE 100 MG
1 CAPSULE ORAL NIGHTLY
COMMUNITY

## 2023-06-16 RX ORDER — LISDEXAMFETAMINE DIMESYLATE 10 MG/1
10 CAPSULE ORAL DAILY
Qty: 30 CAPSULE | Refills: 0 | Status: SHIPPED | OUTPATIENT
Start: 2023-08-17 | End: 2023-06-16

## 2023-06-16 RX ORDER — LISDEXAMFETAMINE DIMESYLATE 10 MG/1
10 CAPSULE ORAL DAILY
Qty: 30 CAPSULE | Refills: 0 | Status: SHIPPED | OUTPATIENT
Start: 2023-07-17 | End: 2023-06-16

## 2023-06-16 RX ORDER — DEXTROAMPHETAMINE SACCHARATE, AMPHETAMINE ASPARTATE, DEXTROAMPHETAMINE SULFATE AND AMPHETAMINE SULFATE 2.5; 2.5; 2.5; 2.5 MG/1; MG/1; MG/1; MG/1
10 TABLET ORAL DAILY
Qty: 30 TABLET | Refills: 0 | Status: SHIPPED | OUTPATIENT
Start: 2023-06-16 | End: 2023-07-16

## 2023-06-16 RX ORDER — DEXTROAMPHETAMINE SACCHARATE, AMPHETAMINE ASPARTATE, DEXTROAMPHETAMINE SULFATE AND AMPHETAMINE SULFATE 2.5; 2.5; 2.5; 2.5 MG/1; MG/1; MG/1; MG/1
10 TABLET ORAL DAILY
Qty: 30 TABLET | Refills: 0 | Status: SHIPPED | OUTPATIENT
Start: 2023-08-17 | End: 2023-06-16

## 2023-06-16 RX ORDER — MULTIVITAMIN WITH IRON
250 TABLET ORAL NIGHTLY
COMMUNITY

## 2023-06-16 RX ORDER — LISDEXAMFETAMINE DIMESYLATE 10 MG/1
10 CAPSULE ORAL DAILY
Qty: 30 CAPSULE | Refills: 0 | Status: SHIPPED | OUTPATIENT
Start: 2023-06-16 | End: 2023-07-16

## 2023-06-16 RX ORDER — GUAIFENESIN/EPHEDRINE HCL 200-12.5MG
1 TABLET ORAL NIGHTLY
COMMUNITY

## 2023-06-27 ENCOUNTER — HOSPITAL ENCOUNTER (OUTPATIENT)
Facility: HOSPITAL | Age: 33
Setting detail: HOSPITAL OUTPATIENT SURGERY
Discharge: HOME | End: 2023-06-27
Attending: INTERNAL MEDICINE | Admitting: INTERNAL MEDICINE
Payer: COMMERCIAL

## 2023-06-27 ENCOUNTER — ANESTHESIA EVENT (OUTPATIENT)
Dept: ENDOSCOPY | Facility: HOSPITAL | Age: 33
End: 2023-06-27
Payer: COMMERCIAL

## 2023-06-27 ENCOUNTER — ANESTHESIA (OUTPATIENT)
Dept: ENDOSCOPY | Facility: HOSPITAL | Age: 33
End: 2023-06-27
Payer: COMMERCIAL

## 2023-06-27 VITALS
OXYGEN SATURATION: 100 % | HEART RATE: 91 BPM | RESPIRATION RATE: 20 BRPM | HEIGHT: 61 IN | BODY MASS INDEX: 31.34 KG/M2 | WEIGHT: 166 LBS | DIASTOLIC BLOOD PRESSURE: 76 MMHG | TEMPERATURE: 97 F | SYSTOLIC BLOOD PRESSURE: 110 MMHG

## 2023-06-27 LAB — B-HCG UR QL: NEGATIVE

## 2023-06-27 PROCEDURE — 0DJ08ZZ INSPECTION OF UPPER INTESTINAL TRACT, VIA NATURAL OR ARTIFICIAL OPENING ENDOSCOPIC: ICD-10-PCS | Performed by: INTERNAL MEDICINE

## 2023-06-27 PROCEDURE — BD47ZZZ ULTRASONOGRAPHY OF GASTROINTESTINAL TRACT: ICD-10-PCS | Performed by: INTERNAL MEDICINE

## 2023-06-27 PROCEDURE — 81025 URINE PREGNANCY TEST: CPT

## 2023-06-27 RX ORDER — HYDROMORPHONE HYDROCHLORIDE 1 MG/ML
0.2 INJECTION, SOLUTION INTRAMUSCULAR; INTRAVENOUS; SUBCUTANEOUS EVERY 5 MIN PRN
OUTPATIENT
Start: 2023-06-27 | End: 2023-06-27

## 2023-06-27 RX ORDER — LIDOCAINE HYDROCHLORIDE 10 MG/ML
INJECTION, SOLUTION EPIDURAL; INFILTRATION; INTRACAUDAL; PERINEURAL AS NEEDED
Status: DISCONTINUED | OUTPATIENT
Start: 2023-06-27 | End: 2023-06-27 | Stop reason: SURG

## 2023-06-27 RX ORDER — HYDROMORPHONE HYDROCHLORIDE 1 MG/ML
0.4 INJECTION, SOLUTION INTRAMUSCULAR; INTRAVENOUS; SUBCUTANEOUS EVERY 5 MIN PRN
OUTPATIENT
Start: 2023-06-27 | End: 2023-06-27

## 2023-06-27 RX ORDER — HYDROMORPHONE HYDROCHLORIDE 1 MG/ML
0.6 INJECTION, SOLUTION INTRAMUSCULAR; INTRAVENOUS; SUBCUTANEOUS EVERY 5 MIN PRN
OUTPATIENT
Start: 2023-06-27 | End: 2023-06-27

## 2023-06-27 RX ORDER — NALOXONE HYDROCHLORIDE 0.4 MG/ML
80 INJECTION, SOLUTION INTRAMUSCULAR; INTRAVENOUS; SUBCUTANEOUS AS NEEDED
OUTPATIENT
Start: 2023-06-27 | End: 2023-06-27

## 2023-06-27 RX ORDER — SODIUM CHLORIDE, SODIUM LACTATE, POTASSIUM CHLORIDE, CALCIUM CHLORIDE 600; 310; 30; 20 MG/100ML; MG/100ML; MG/100ML; MG/100ML
INJECTION, SOLUTION INTRAVENOUS CONTINUOUS
OUTPATIENT
Start: 2023-06-27

## 2023-06-27 RX ORDER — SODIUM CHLORIDE, SODIUM LACTATE, POTASSIUM CHLORIDE, CALCIUM CHLORIDE 600; 310; 30; 20 MG/100ML; MG/100ML; MG/100ML; MG/100ML
INJECTION, SOLUTION INTRAVENOUS CONTINUOUS
Status: DISCONTINUED | OUTPATIENT
Start: 2023-06-27 | End: 2023-06-27

## 2023-06-27 RX ADMIN — SODIUM CHLORIDE, SODIUM LACTATE, POTASSIUM CHLORIDE, CALCIUM CHLORIDE: 600; 310; 30; 20 INJECTION, SOLUTION INTRAVENOUS at 09:40:00

## 2023-06-27 RX ADMIN — LIDOCAINE HYDROCHLORIDE 25 MG: 10 INJECTION, SOLUTION EPIDURAL; INFILTRATION; INTRACAUDAL; PERINEURAL at 09:22:00

## 2023-07-07 ENCOUNTER — TELEPHONE (OUTPATIENT)
Dept: FAMILY MEDICINE CLINIC | Facility: CLINIC | Age: 33
End: 2023-07-07

## 2023-07-07 RX ORDER — POLYMYXIN B SULFATE AND TRIMETHOPRIM 1; 10000 MG/ML; [USP'U]/ML
2 SOLUTION OPHTHALMIC 3 TIMES DAILY
Qty: 10 ML | Refills: 0 | Status: SHIPPED | OUTPATIENT
Start: 2023-07-07 | End: 2023-07-14

## 2023-09-18 ENCOUNTER — PATIENT MESSAGE (OUTPATIENT)
Dept: FAMILY MEDICINE CLINIC | Facility: CLINIC | Age: 33
End: 2023-09-18

## 2023-09-18 DIAGNOSIS — F90.2 ATTENTION DEFICIT HYPERACTIVITY DISORDER (ADHD), COMBINED TYPE: ICD-10-CM

## 2023-09-18 RX ORDER — LISDEXAMFETAMINE DIMESYLATE 10 MG/1
10 CAPSULE ORAL DAILY
Qty: 30 CAPSULE | Refills: 0 | Status: SHIPPED | OUTPATIENT
Start: 2023-09-18 | End: 2023-10-18

## 2023-11-10 ENCOUNTER — NURSE ONLY (OUTPATIENT)
Dept: FAMILY MEDICINE CLINIC | Facility: CLINIC | Age: 33
End: 2023-11-10
Payer: COMMERCIAL

## 2023-11-10 DIAGNOSIS — Z23 NEED FOR VACCINATION: Primary | ICD-10-CM

## 2023-11-10 PROCEDURE — 90686 IIV4 VACC NO PRSV 0.5 ML IM: CPT | Performed by: FAMILY MEDICINE

## 2023-11-10 PROCEDURE — 90471 IMMUNIZATION ADMIN: CPT | Performed by: FAMILY MEDICINE

## 2023-12-01 ENCOUNTER — TELEMEDICINE (OUTPATIENT)
Dept: INTERNAL MEDICINE CLINIC | Facility: CLINIC | Age: 33
End: 2023-12-01
Payer: COMMERCIAL

## 2023-12-01 DIAGNOSIS — Z51.81 THERAPEUTIC DRUG MONITORING: Primary | ICD-10-CM

## 2023-12-01 DIAGNOSIS — E66.9 OBESITY (BMI 30-39.9): ICD-10-CM

## 2023-12-01 PROCEDURE — 99213 OFFICE O/P EST LOW 20 MIN: CPT | Performed by: NURSE PRACTITIONER

## 2023-12-01 NOTE — PATIENT INSTRUCTIONS
Welcome to the [facility] Management Program!!  Thank you for placing your trust in our health care team, I look forward to working with you along this journey to better health! Next steps:     1.  Schedule a personal nutrition consultation with one of our registered dieticians. Bring along your food journal (3 days minimum). See journal options below. 2.  Fill your prescribed medication and take as discussed and prescribed: zepbound 2.5mg weekly x 4 weeks     Please try to work on the following dietary changes this first month:  Daily protein recommendation to start:  grams  Daily carbohydrate: <105g  Daily calories: 1,300  1. Drink water with meals and throughout the day, cut down on soda and/or juice if consumed. Consider flavored water options like Bubbly, Spindrift, Hint and Ez. 2.  Eat breakfast daily and focus on having protein with each meal, examples include: greek yogurt, cottage cheese, hard boiled egg, whole grain toast with peanut butter. 3.  Reduce refined carbohydrates and sugars which includes items such as sweets, as well as rice, pasta, and bread and make sure to choose whole grain options when having them with just 1 serving per meal about the size of your inner palm. 4.  Consume non starchy veggies daily working towards making them a good 50% of your daily food intake. Add them to lunch and dinner consistently. 5.  Optional can start a daily probiotic: VSL#3, (order on line at www.vsl3. com). Take 1 capsule daily with water for 30 days, then reduce to 1 every other day (this will reduce the cost). Capsules can be left out for 2 weeks, but then must be refrigerated. Please download richard My Fitness Aj Cuevas! Or Net Diary to monitor daily dietary intake and you will be able to see if you are eating the right amount of calories or too much or too little which would hinder weight loss. Additionally this will help to see your daily carbohydrate and protein intake. When you set the caridad up choose 1-2 lbs/week as a goal.  Keeping a paper food journal is an option as well to remain accountable for your choices- this is the start to mindful eating! A low calorie diet has been consistently shown to support weight loss. Continue or start exercising to help establish a routine. If not already exercising begin with 1 day and progress as able with long-term goal of 30 minutes 5 days a week at a minimum. Meditation daily can help manage and control stress. Chronic stress can make weight loss difficult. Exercising is one way to help with stress, but meditation using the CALM Caridad or another comparable alternative can be done in your home or place of work with little time commitment. This Caridad can also help work on behavior change and improve sleep. Check out the segment under Calm Masterclass and listen to The 4 Pillars of Health. A great way to begin learning about the foundation of lifestyle with practical tips to use in your every day. Check out www.yourweightmatters. org blog for continued daily support and education along this weight loss journey! Patient Resources:     Personal Training/Fitness Classes/Health Coaching     Shellie Bosch and [place] Sophiaside @ [email or web address] Full fitness center with group fitness and personal training. Discount available as client of [facility]. Health Coaching and Personal Training with Lelia Ashley at our [facility]- individual weekly coaching with option to add personal training and small group fitness classes targeted at weight loss- [phone] and/or email @ [email or web address]  360FIT Antonietta [email or web address] Group Fitness [phone] and/or email Fannie Nelson at [email or web address]  [address] with multiple locations: Chelsy (www.HealthyTweet), Eat The June & Minor (www.eatElastra. INCIDE), ITmedia KK Fitness (www.Relaborate), The Exercise  (www.exercisecoach.INCIDE)     Online Fitness  Fitness  on [employer] in 10 DVD series- www. kaycy80ZPH. INCIDE  Sit and Be Fit - Chair exercise series Www.sitandbefit. org  Hip Hop Fit with Channing Leone at www.hiphopfit. net     Apps for on Neuronetrix 7 Minute Workout (orange box with white 7) - free on the go HIIT training caridad  Peloton Caridad @ AlphaSmart     Nutrition Trackers and Tools  LoseIT! And My Fitness Pal apps and on line for tracking nutrition  NOOM - virtual health coaching  FitFoundation (healthy meals on the go) in [employer] @ TimeBridge [email or web address]. Nigel Quiroz MD @ wwwLegalJump and Gwenith Goldberg (keto and low carb plans recommended) @ www. Allon Therapeutics.LYC, Metabolic Meals @ www. MyMetabolicMeals. com - individual prepared meals to go  ViaCyte, Iagnosis, [employer], Every Plate, [place]- on line meal delivery programs for preparation at home  [employer] in [place] for homemade meals to go @ wwwVHXmealNautilus Solar Energy. INCIDE  Diet Doctor @ www. dietdoctor. INCIDE - low carb swaps  PhoneGuard - meal prep and planning caridad (www.yummly. com)     Stress Management/Behavior/Mindful Eating  CALM meditation caridad (www.calm. INCIDE)  Headspace  Am I Hungry? Mindful eating virtual  caridad  Www.yourweightmatters. org - Obesity Action Coalition sponsored Blog posts daily  Motivation caridad (black box with white \")- daily supportive messages sent to your phone     Books/Video Education/Podcasts  Mindless Eating by Angela Jarvis  Why We Get Sick by Gemini Purdy (a book about insulin resistance)  Atomic Habits by Sharee Pino (a book about taking small steps to promote greater behavior change)   Can't Hurt Me by Yareli Ching (a book exploring the power of discipline in achieving your goals)  The End of Dieting: How to Live for Life by Dr. Desmond Campos M.D. or listen to The [facility] Episode 61:  Understanding \"Nutritarian\" Eating w/Dr. Ferrer Shadow  Your Body in Balance: The World [employer] of Food, Hormones, and Health by Dr. Stephanie Andres  The Menopause Diet Plan by Fatemeh Lujan and [facility] AT [facility]  The Complete Guide to fasting by Dr. Ankita Merida, [address] [facility] by Kathy Farley, Ph.D, R.D. Weight Loss Surgery Will Not Treat Food Addiction by Rachel Still Ph.D  The [address] [place] [address] on plant based nutrition  Fed Up - documentary about obesity (Free on [facility])  The Truth About Sugar - documentary on sugar (Free on InforSense, https://youtu. be/8G2itsxHO2m)  The Dr. Washington Humza by Dr. Ty Bateman MD  Fitlosophy Fitspiration - journal to better health (found at [employer] in fitness aisle)  What Happened to You?- a look at the impact trauma has on behavior written by Pierre Lindo and Dr. Tena Malloy Again by Mike Wolf - discovering your true self after trauma  Erica Doherty talk on Outsell, The Call to Courage  Podcasts: The Exam Room by the Physician's Committee, Nutrition Facts by Dr. Stas Buenrostro    We are here to support you with weight loss, but please remember that you still need your primary care provider for your routine health maintenance.

## 2023-12-01 NOTE — PROGRESS NOTES
[facility] WEIGHT MANAGEMENT VIRTUAL VIDEO ENCOUNTER     Marilyn Ventura verbally consents to a Virtual/Telephone Check-In service on 12/01/23  Patient understands and accepts financial responsibility for any deductible, co-insurance and/or co-pays associated with this service. HISTORY OF PRESENT ILLNESS  Chief Complaint   Patient presents with    Other     New patient, works at [employer] is a 26year old female new patient, is being evaluated as a video visit using Telemedicine with live, interactive video and audio. Marilyn Ventura for initiation of medical weight loss program.  Patient presents today with c/o excess weight. Referred by, [employer] employee     Has been struggling with weight for the past couple of years. .. 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 Reason/goal for weight loss: To be at a healthy weight and reduce increased risk of disease in 6 months and maintain weight loss in 1 year   Triggers for weight gain? Stress  Previous weight loss programs? NO, crossfit (macros)   Previous weight loss medications?  Phentermine (Adipex) and Bupropion (Wellbutrin)    Reviewed [facility] patient contract: Readiness for Lifestyle change: 9/10, Interest in Medication: 8/10, Bariatric surgery interest: 0/10    Weight  Starting weight:  170 lbs 5'1.5   Max weight: 180  Lowest weight: 143    Wt Readings from Last 6 Encounters:   06/27/23 166 lb (75.3 kg) 06/16/23 166 lb (75.3 kg)   07/06/22 173 lb (78.5 kg)   06/02/22 173 lb (78.5 kg)   03/02/22 180 lb 6.4 oz (81.8 kg)   02/16/22 174 lb (78.9 kg)        Typical diet   Breakfast Lunch Dinner Snacks Fluids   8 am -Fairlife protein shake , 9 am - fruit- 1 /2 cups.  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)   Marital status: - 2 kids  (10 and 11 yrs)   Support: yes  Tobacco use: none  ETOH use: 4  per/week  Supplements: Magnesium Glyinate   Exercise: 2 days per week- walking the dog and walking on treadmill   Stress level: 7/10  Sleep hours and integrity: 6 Hours per night    MEDICAL HISTORY  PMH reviewed:   Cardiac disorders:negative   Depression/anxiety: anxiety   Glaucoma: negative  Kidney stones: negative  Eating disorder: negative  Migraines/seizures: headaches   Joint-related conditions: back pain - has been seeing chiropractor   Liver disease: negative  Thyroid disease: negative  Constipation: negative  Diabetes: negative  Sleep Apnea hx: n/a   Cancer hx: negative  DVT: negative  Family or personal history of Pancreatic issues / Medullary Thyroid Cancer: negative  History of bariatric surgery: negative    [address] reviewed: obesity in parent/s or sibling:  +siblings and mother    REVIEW OF SYSTEMS  GENERAL: feels well otherwise, +fatigue  SKIN: denies any rashes to skin folds   HEENT: snoring- no ; denies neck thickening  LUNGS: denies shortness of breath with exertion, no apnea  CARDIOVASCULAR: denies chest pain on exertion, denies palpitations or pedal edema  GI: denies abdominal pain, distention, No N/V/D/C  MUSCULOSKELETAL: +back pain, joint pains  NEURO: denies headaches or dizziness  ENDOCRINE: denies any excess hunger, urination or thirst, denies any purple striae  PSYCH: denies change in behavior or mood, hx of anxiety     EXAM  LMP  (LMP Unknown) , Percent body fat: unable to complete (will perform in office)   Reviewed recent set of vitals       GENERAL: well developed, well nourished, in no apparent distress, speaking in full sentences comfortably   SKIN: warm, pink, dry without rashes to exposed area   EYES: conjunctiva pink  HEENT: atraumatic, normocephalic  LUNGS: normal work of breathing, non labored  CARDIO: normal work, no exertion  EXTREMITIES: no cyanosis, no clubbing, no edema  NEURO: Oriented times three  PSYCH: pleasant, cooperative, normal mood and affect, no si/hi    Lab Results   Component Value Date    GLU 81 06/16/2023    BUN 12 06/16/2023    BUNCREA NOT APPLICABLE 41/54/5084    CREATSERUM 0.77 06/16/2023    ANIONGAP 5 06/16/2023    GFRNAA 93 06/02/2022    GFRAA 107 06/02/2022    CA 8.9 06/16/2023    OSMOCALC 285 06/16/2023    ALKPHO 50 06/16/2023    AST 22 06/16/2023    ALT 24 06/16/2023    BILT 0.4 06/16/2023    TP 7.4 06/16/2023    ALB 4.0 06/16/2023    GLOBULIN 3.4 06/16/2023    AGRATIO 1.8 06/02/2022     06/16/2023    K 4.3 06/16/2023     06/16/2023    CO2 26.0 06/16/2023     Lab Results   Component Value Date     06/16/2023    A1C 5.2 06/16/2023     Lab Results   Component Value Date    CHOLEST 171 06/16/2023    TRIG 77 06/16/2023    HDL 59 06/16/2023    LDL 98 06/16/2023    VLDL 13 06/16/2023    TCHDLRATIO 3.0 06/02/2022    NONHDLC 112 06/16/2023     Lab Results   Component Value Date    B12 658 06/16/2023    VITB12 446 06/02/2022     Lab Results   Component Value Date    VITD 29.6 (L) 06/16/2023       Current Outpatient Medications on File Prior to Visit Medication Sig Dispense Refill    Lisdexamfetamine Dimesylate (VYVANSE) 40 MG Oral Cap Take 1 capsule (40 mg total) by mouth daily. 30 capsule 0    [START ON 12/18/2023] Lisdexamfetamine Dimesylate (VYVANSE) 40 MG Oral Cap Take 1 capsule (40 mg total) by mouth daily. 30 capsule 0    Lisdexamfetamine Dimesylate (VYVANSE) 10 MG Oral Cap Take 10 mg by mouth daily. 30 capsule 0    [START ON 12/18/2023] Lisdexamfetamine Dimesylate (VYVANSE) 10 MG Oral Cap Take 10 mg by mouth daily. 30 capsule 0    ibuprofen 800 MG Oral Tab Take 1 tablet (800 mg total) by mouth every 8 (eight) hours as needed for Pain. 90 tablet 1    magnesium 250 MG Oral Tab Take 1 tablet (250 mg total) by mouth at bedtime. L-Theanine 100 MG Oral Cap Take 1 capsule by mouth at bedtime. 5-Hydroxytryptophan (5-HTP) 100 MG Oral Cap Take 1 capsule by mouth at bedtime. tiZANidine 4 MG Oral Tab Take 1 tablet (4 mg total) by mouth every 8 (eight) hours as needed. 30 tablet 2    PANTOPRAZOLE SODIUM 40 MG Oral Tab EC TAKE 1 TABLET BY MOUTH EVERY DAY IN THE MORNING BEFORE BREAKFAST (Patient taking differently: as needed.) 90 tablet 1    Tretinoin Microsphere (RETIN-A MICRO PUMP) 0.08 % External Gel Apply 1 Application topically nightly. To face and chest 50 g 4    levonorgestrel 20 MCG/24HR Intrauterine IUD 20 mcg (1 each total) by Intrauterine route once. No current facility-administered medications on file prior to visit. ASSESSMENT/PLAN    ICD-10-CM    1. Therapeutic drug monitoring  Z51.81         Initial Weight Data and Goal Weight Loss:     Initial consult: 170 lbs on 12/1/2023, Down  Lb:  lbs total     PLAN   Will trial zepbound 2.5mg weekly x4 weeks and then (send in [employer] message in 3 weeks) to say either you want to stay at the same dose or increase to 5mg.  Denies any personal or family history of pancreatitis, pancreatic cancer, thyroid cancer, MEN2    --advised of side effects and adverse effects of this medication  Contradictions: is already on vyvanse (no stimulant medications), phentermine in the past   Body composition will be done in the office   Anxiety, stable working with therapist   Back pain, can think about doing aqua therapy   Sleep hygiene, discussed ways to help with this and has sleep study scheduled next month   Decrease carbs, increase protein, no skipping meals   Needs to incorporate exercise regimen FITTE: ACSM recommendations (150-300 minutes/ week in active weight loss)   Follow up with dietitian and psychologist as recommended. Discussed the role of sleep and stress in weight management. Counseled on comprehensive weight loss plan including attention to nutrition, exercise and behavior/stress management for success. See patient instruction below for more details. Total time spent on chart review, pre-charting, obtaining history, counseling, and educating, reviewing labs was 40 minutes. There are no Patient Instructions on file for this visit. No follow-ups on file. Patient verbalizes understanding. Pt understands phone/video evaluation is not a substitute for face to face examination or emergency care. Pt advised to go to the ER or call 911 for worsening symptoms or acute distress. Please note that the following visit was completed using two-way, real-time interactive audio and/or video communication. This has been done in good parth to provide continuity of care in the best interest of the provider-patient relationship, due to the ongoing public health crisis/national emergency and because of restrictions of visitation. There are limitations of this visit as no physical exam could be performed. Every conscious effort was taken to allow for sufficient and adequate time. This billing was spent on reviewing labs, medications, radiology tests and decision making. Appropriate medical decision-making and tests are ordered as detailed in the plan of care above.      Debi Alonso VERÓNICA Durant

## 2023-12-08 ENCOUNTER — TELEPHONE (OUTPATIENT)
Dept: INTERNAL MEDICINE CLINIC | Facility: CLINIC | Age: 33
End: 2023-12-08

## 2023-12-08 NOTE — TELEPHONE ENCOUNTER
[facility] sent request to do PA for [facility] and the order is for Zepbound  Need to call [facility]

## 2023-12-12 RX ORDER — TIRZEPATIDE 2.5 MG/.5ML
2.5 INJECTION, SOLUTION SUBCUTANEOUS WEEKLY
Qty: 2 ML | Refills: 0 | Status: SHIPPED | OUTPATIENT
Start: 2023-12-12

## 2023-12-12 NOTE — TELEPHONE ENCOUNTER
I called [facility] to let them know it is for zepbound and he does not think he can get med, put me on hold. They can get it and he needs me to resend the order.   done

## 2023-12-28 ENCOUNTER — TELEPHONE (OUTPATIENT)
Dept: INTERNAL MEDICINE CLINIC | Facility: CLINIC | Age: 33
End: 2023-12-28

## 2024-01-03 NOTE — TELEPHONE ENCOUNTER
Requesting   Requested Prescriptions     Pending Prescriptions Disp Refills    Tirzepatide-Weight Management (ZEPBOUND) 2.5 MG/0.5ML Subcutaneous Solution Auto-injector 2 mL 0     Sig: Inject 2.5 mg into the skin once a week.       LOV: 12/1/23  RTC: 8 weeks  Filled: 12/12/23 #2 with 0 refills    Future Appointments   Date Time Provider Department Center   2/10/2024  9:15 PM SCHEDULE BY DATE [facility]   4/22/2024  4:00 PM Elsa Quintanilla, VERÓNICA [facility]

## 2024-01-04 RX ORDER — TIRZEPATIDE 2.5 MG/.5ML
2.5 INJECTION, SOLUTION SUBCUTANEOUS WEEKLY
Qty: 2 ML | Refills: 0 | Status: SHIPPED | OUTPATIENT
Start: 2024-01-04

## 2024-01-29 ENCOUNTER — PATIENT MESSAGE (OUTPATIENT)
Dept: INTERNAL MEDICINE CLINIC | Facility: CLINIC | Age: 34
End: 2024-01-29

## 2024-01-30 RX ORDER — TIRZEPATIDE 5 MG/.5ML
5 INJECTION, SOLUTION SUBCUTANEOUS WEEKLY
Qty: 2 ML | Refills: 0 | Status: SHIPPED | OUTPATIENT
Start: 2024-01-30

## 2024-01-30 NOTE — TELEPHONE ENCOUNTER
From: Jennifer Pitts  To: Elsa Quintanilla  Sent: 1/29/2024 6:36 PM CST  Subject: Zepbound refill     Good evening ,   I am due for a refill on zepbound .   This is my second month at the 2.5 mg dose ,   I am down 12 lb .   I wasn’t sure if it is recommended I move up or if I should continue at 2.5 mg .  Thank you .     Jennifer Pitts

## 2024-01-30 NOTE — TELEPHONE ENCOUNTER
Requesting   Zepbound increase     LOV: 12/1/23  RTC: 8 weeks  Filled: 1/4/24 #2 with 0 refills    Future Appointments   Date Time Provider Department Center   2/10/2024  9:15 PM SCHEDULE BY DATE [facility]   2/12/2024  2:20 PM Elsa Quintanilla APRN [facility]   4/22/2024  4:00 PM Elsa Quintanilla APRN [facility]

## 2024-02-02 ENCOUNTER — TELEPHONE (OUTPATIENT)
Dept: INTERNAL MEDICINE CLINIC | Facility: CLINIC | Age: 34
End: 2024-02-02

## 2024-02-06 NOTE — TELEPHONE ENCOUNTER
AMF added BMI to her last note.  We need to print and fax that with FDA shortages and the denial to

## 2024-02-06 NOTE — TELEPHONE ENCOUNTER
Denied in epic because they said no records received   Printed denial and must attach notes and fax back to [facility] at

## 2024-02-12 ENCOUNTER — OFFICE VISIT (OUTPATIENT)
Dept: INTERNAL MEDICINE CLINIC | Facility: CLINIC | Age: 34
End: 2024-02-12
Payer: COMMERCIAL

## 2024-02-12 VITALS
DIASTOLIC BLOOD PRESSURE: 78 MMHG | WEIGHT: 158 LBS | HEART RATE: 110 BPM | RESPIRATION RATE: 16 BRPM | HEIGHT: 60.5 IN | SYSTOLIC BLOOD PRESSURE: 120 MMHG | BODY MASS INDEX: 30.22 KG/M2

## 2024-02-12 DIAGNOSIS — Z51.81 THERAPEUTIC DRUG MONITORING: Primary | ICD-10-CM

## 2024-02-12 DIAGNOSIS — E66.9 OBESITY (BMI 30-39.9): ICD-10-CM

## 2024-02-12 PROCEDURE — 3074F SYST BP LT 130 MM HG: CPT | Performed by: NURSE PRACTITIONER

## 2024-02-12 PROCEDURE — 99214 OFFICE O/P EST MOD 30 MIN: CPT | Performed by: NURSE PRACTITIONER

## 2024-02-12 PROCEDURE — 3078F DIAST BP <80 MM HG: CPT | Performed by: NURSE PRACTITIONER

## 2024-02-12 PROCEDURE — 3008F BODY MASS INDEX DOCD: CPT | Performed by: NURSE PRACTITIONER

## 2024-02-12 NOTE — PROGRESS NOTES
HISTORY OF PRESENT ILLNESS  Chief Complaint   Patient presents with    Weight Check     -12     Jennifer Pitts is a 33 year old female here for follow up with medical weight loss program for the treatment of overweight, obesity, or morbid obesity.     Down 12 lbs (first month f/u from telemedicine)   Compliant on zepbound 5mg weekly   Tolerating well, helping with decreasing appetite and no side effects     Had to switch over to adderall with PCP (since vyvanse is not covered by insurance)  Feels like she might be under eating   Feels like back pain has improved a little (but might have a flare   Success:  Making healthy food decisions is much easier. Getting out of 160 lb range !   Challenging: Last 2 weeks - diet needs improvement / increase activity   Exercise/Activity: 3-4x/ week, via walking, 3 days per week- video, 1 day per week- strength training  Nutrition: eating regular meals, +protein, minimal veggies. interm tracking reports  Meals out per week on average: 1-3  Stress is manageable   Sleep: 6-7 hours/night, waking up feeling tired (back pain)    Denies chest pain, shortness of breath, dizziness, blurred vision, headache, paresthesia, nausea/vomiting.     Breakfast Lunch Dinner Snacks Fluids   Reviewed              Wt Readings from Last 6 Encounters:   02/12/24 158 lb (71.7 kg)   06/27/23 166 lb (75.3 kg)   06/16/23 166 lb (75.3 kg)   07/06/22 173 lb (78.5 kg)   06/02/22 173 lb (78.5 kg)   03/02/22 180 lb 6.4 oz (81.8 kg)          REVIEW OF SYSTEMS  GENERAL: feels well otherwise, denied any fevers chills or night sweats   LUNGS: denies shortness of breath  CARDIOVASCULAR: denies chest pain  GI: denies abdominal pain  MUSCULOSKELETAL: + back pain, joint pains   PSYCH: denies change in behavior or mood, denies feeling sad or depressed    EXAM  /78   Pulse 110   Resp 16   Ht 5' 0.5\" (1.537 m)   Wt 158 lb (71.7 kg)   LMP  (LMP Unknown)   BMI 30.35 kg/m²       GENERAL: well developed, well  nourished, in no apparent distress, A/O x3  SKIN: no rashes, no suspicious lesions  HEENT: atraumatic, normocephalic, OP-clear, PERRLA  NECK: supple, no adenopathy  LUNGS: CTA in all fields, breathing non labored  CARDIO: RRR without murmur  GI: +BS, NT/ND, no masses or HSM  EXTREMITIES: no cyanosis, no clubbing, no edema    Lab Results   Component Value Date    GLU 81 06/16/2023    BUN 12 06/16/2023    BUNCREA NOT APPLICABLE 06/02/2022    CREATSERUM 0.77 06/16/2023    ANIONGAP 5 06/16/2023    GFRNAA 93 06/02/2022    GFRAA 107 06/02/2022    CA 8.9 06/16/2023    OSMOCALC 285 06/16/2023    ALKPHO 50 06/16/2023    AST 22 06/16/2023    ALT 24 06/16/2023    BILT 0.4 06/16/2023    TP 7.4 06/16/2023    ALB 4.0 06/16/2023    GLOBULIN 3.4 06/16/2023    AGRATIO 1.8 06/02/2022     06/16/2023    K 4.3 06/16/2023     06/16/2023    CO2 26.0 06/16/2023     Lab Results   Component Value Date     06/16/2023    A1C 5.2 06/16/2023     Lab Results   Component Value Date    CHOLEST 171 06/16/2023    TRIG 77 06/16/2023    HDL 59 06/16/2023    LDL 98 06/16/2023    VLDL 13 06/16/2023    TCHDLRATIO 3.0 06/02/2022    NONHDLC 112 06/16/2023     Lab Results   Component Value Date    B12 658 06/16/2023    VITB12 446 06/02/2022     Lab Results   Component Value Date    VITD 29.6 (L) 06/16/2023       Current Outpatient Medications on File Prior to Visit   Medication Sig Dispense Refill    hydrocortisone 2.5 % External Ointment APPLY TOPICALLY TO THE AFFECTED AREAS ON THE EYELIDS AND NECK TWICE DAILY FOR TEN DAYS      Tirzepatide-Weight Management (ZEPBOUND) 5 MG/0.5ML Subcutaneous Solution Auto-injector Inject 5 mg into the skin once a week. 2 mL 0    Amphetamine-Dextroamphet ER (ADDERALL XR) 25 MG Oral Capsule SR 24 Hr Take 1 capsule (25 mg total) by mouth daily. 30 capsule 0    [START ON 2/27/2024] Amphetamine-Dextroamphet ER (ADDERALL XR) 25 MG Oral Capsule SR 24 Hr Take 1 capsule (25 mg total) by mouth daily. 30 capsule 0     [START ON 3/29/2024] Amphetamine-Dextroamphet ER (ADDERALL XR) 25 MG Oral Capsule SR 24 Hr Take 1 capsule (25 mg total) by mouth daily. 30 capsule 0    amphetamine-dextroamphetamine (ADDERALL) 10 MG Oral Tab Take 1 tablet (10 mg total) by mouth daily. 30 tablet 0    [START ON 2/27/2024] amphetamine-dextroamphetamine (ADDERALL) 10 MG Oral Tab Take 1 tablet (10 mg total) by mouth daily. 30 tablet 0    [START ON 3/29/2024] amphetamine-dextroamphetamine (ADDERALL) 10 MG Oral Tab Take 1 tablet (10 mg total) by mouth daily. 30 tablet 0    spironolactone 100 MG Oral Tab       ibuprofen 800 MG Oral Tab Take 1 tablet (800 mg total) by mouth every 8 (eight) hours as needed for Pain. 90 tablet 1    magnesium 250 MG Oral Tab Take 1 tablet (250 mg total) by mouth at bedtime.      L-Theanine 100 MG Oral Cap Take 1 capsule by mouth at bedtime.      5-Hydroxytryptophan (5-HTP) 100 MG Oral Cap Take 1 capsule by mouth at bedtime.      tiZANidine 4 MG Oral Tab Take 1 tablet (4 mg total) by mouth every 8 (eight) hours as needed. 30 tablet 2    PANTOPRAZOLE SODIUM 40 MG Oral Tab EC TAKE 1 TABLET BY MOUTH EVERY DAY IN THE MORNING BEFORE BREAKFAST (Patient taking differently: as needed.) 90 tablet 1    Tretinoin Microsphere (RETIN-A MICRO PUMP) 0.08 % External Gel Apply 1 Application topically nightly. To face and chest 50 g 4    levonorgestrel 20 MCG/24HR Intrauterine IUD 20 mcg (1 each total) by Intrauterine route once.       No current facility-administered medications on file prior to visit.       ASSESSMENT/PLAN    ICD-10-CM    1. Therapeutic drug monitoring  Z51.81       2. Obesity (BMI 30-39.9)  E66.9           PLAN   Initial Weight Data and Goal Weight Loss:  Initial consult: #170 lbs on 12/1/2023  Weight Calculations  Initial Weight: 170 lbs  Initial Weight Date: 12/01/23  Today's Weight: 158 lbs  5% Goal: 8.5  10% Goal: 17  Total Weight Loss: 12 lbs  Total weight loss: Down 12 lbs total, Net loss 12 lbs  Will continue  medications: zepbound 5mg weekly (will keep us posted it too strong)  --advised of side effects and adverse effects of this medication  Contradictions: is already on adderall/ was on vyvanse previously (no stimulant medications), phentermine in the past    Reviewed labs   Body composition: total body fat 35.2%, visceral fat 8 and muscle mass 27.3 lbs  Will continue with vitamin d OTC  Anxiety, stable, has therapist   Back pain, can think about doing aqua therapy (mentioned with last appt)  Wrote out macros (make sure not to be under eating)   Sleep hygiene, discussed ways to help with this and has sleep study scheduled next month   Nutrition: Low carb diet, recommended to eat breakfast daily/ regular protein intake  Follow up with dietitian and psychologist as recommended.  Discussed the role of sleep and stress in weight management.  Counseled on comprehensive weight loss plan including attention to nutrition, exercise and behavior/stress management for success. See patient instruction below for more details.  Discussed strategies to overcome barriers to successful weight loss and weight maintenance  FITTE: ACSM recommendations (150-300 minutes/ week in active weight loss)   Weight Loss Consent to treat reviewed and signed.    Total time spent on chart review, pre-charting, obtaining history, counseling, and educating, reviewing labs was 30 minutes.       NOTE TO PATIENT: The 21st Century Cures Act makes clinical notes like these available to patients in the interest of transparency. Clinical notes are medical documents used by physicians and care providers to communicate with each other. These documents include medical language and terminology, abbreviations, and treatment information that may sound technical and at times possibly unfamiliar. In addition, at times, the verbiage may appear blunt or direct. These documents are one tool providers use to communicate relevant information and clinical opinions of the  care providers in a way that allows common understanding of the clinical context.     There are no Patient Instructions on file for this visit.    No follow-ups on file.    Patient verbalizes understanding.    Elsa Quintanilla APRN

## 2024-02-12 NOTE — PATIENT INSTRUCTIONS
Next steps:  1.  Fill your prescribed medication and take as discussed and prescribed: zepbound 5mg weekly   2.  Schedule a personal nutrition consultation with one of our registered dieticians     Please try to work on the following dietary changes:  Daily protein recommendation to start:  grams  Daily carbohydrate: <105g  Daily calories: 1,300  1.  Drink water with meals and throughout the day, cut down on soda and/or juice if consumed. Consider flavored water options like Bubbly, Spindrift, Hint and Ez.  2.  Eat breakfast daily and focus on having protein with each meal, examples include: greek yogurt, cottage cheese, hard boiled egg, whole grain toast with peanut butter.   3.  Reduce refined carbohydrates and sugars which includes items such as sweets, as well as rice, pasta, and bread and make sure to choose whole grain options when having them with just 1 serving per meal about the size of your inner palm.  4.  Consume non starchy veggies daily working towards making them a good 50% of your daily food intake. Add them to lunch and dinner consistently.  5.  Start a daily probiotic: VSL#3 is recommended, (order on line at www.vsl3.com). Take 1 capsule daily with water for 30 days, then reduce to 1 every other day (this will reduce the cost). Capsules can be left out for 2 weeks, but then must be refrigerated.      Please download richard My Fitness Pal, LoseIt! Or Net Diary to monitor daily dietary intake and you will be able to see if you are eating the right amount of calories or too much or too little which would hinder weight loss. Additionally this will help to see your daily carbohydrate and protein intake. When you set the richard up choose 1-2 lbs/week as a goal.  Keeping a paper food journal is an option as well to remain accountable for your choices- this is the start to mindful eating! A low calorie diet has been consistently shown to support weight loss.     Continue or start exercising to help  establish a routine. If not already exercising begin with 1 day and progress as able with long-term goal of 30 minutes 5 days a week at a minimum.     Meditation daily can help manage and control stress. Chronic stress can make weight loss difficult.  Exercising is one way to help with stress, but meditation using the CALM Caridad or another comparable alternative can be done in your home or place of work with little time commitment. This Caridad can also help work on behavior change and improve sleep. Check out the segment under Calm Masterclass and listen to The 4 Pillars of Health. A great way to begin learning about the foundation of lifestyle with practical tips to use in your every day.     Check out www.yourweightmatters.org blog for continued daily support and education along this weight loss journey!    Patient Resources:     Personal Training/Fitness Classes/Health Coaching     [facility] @ [email or web address] Full fitness center with group fitness and personal training. Discount available as client of [facility].  Health Coaching and Personal Training with January Beck at our [facility]- individual weekly coaching with option to add personal training and small group fitness classes targeted at weight loss- [phone] and/or email @ [email or web address]  [facility] [place] [email or web address].[email or web address] Stewart. Group Fitness [phone] and/or email Carla at [email or web address]  [facility] Hospital[facility] Fitness Centers with multiple locations: HX Diagnostics (www.Atossa Genetics), Eat The Auto I.D. Fitness (www.Zigmo.Estoreify), Fit Body Bootcamp (www.Union Cast Network TechnologybodybootFiltr8p.Estoreify), Cap That (www.allyDVM.Estoreify), The Exercise  (www.exercisecoach.Estoreify)     Online Fitness  Fitness  on Utube  Fit in 10 DVD series- www.iaguz69FBE.com  Sit and Be Fit - Chair exercise series Www.sitandbefit.org  Hip Hop  Fit with Gene Leone at www.hiphopfit.net     Apps for on the Go Fitness  Guy 7 Minute Workout (orange box with white 7) - free on the go HIIT training caridad  Peloton Caridad @ www.onepeloton.com     Nutrition Trackers and Tools  LoseIT! And My Fitness Pal apps and on line for tracking nutrition  NOOM - virtual health coaching  FitFoundation (healthy meals on the go) in [place] @ www.caljstbdcdauz4j.garbs  Bistro MD @ eFolderbistromd.com and Mdjwon63 (keto and low carb plans recommended) @ www.kixvux69.com, Metabolic Meals @ www.MyMetabolicMeals.com - individual prepared meals to go  Gobble, Blue Apron, Home , Every Plate, Sunbasket- on line meal delivery programs for preparation at home  Meal Village in [place] for homemade meals to go @ www.mealvillage.garbs  Diet Doctor @ www.dietdoctor.garbs - low carb swaps  YuLangoLab - meal prep and planning caridad (www.yummly.com)     Stress Management/Behavior/Mindful Eating  CALM meditation caridad (www.calm.com)  Headspace  Am I Hungry? Mindful eating virtual  caridad  Www.yourweightmatters.org - Obesity Action Coalition sponsored Blog posts daily  Motivation caridad (black box with white \")- daily supportive messages sent to your phone     Books/Video Education/Podcasts  Mindless Eating by Luis E Landers  Why We Get Sick by Ori Horvath (a book about insulin resistance)  Atomic Habits by Randy Dockery (a book about taking small steps to promote greater behavior change)   Can't Hurt Me by Julian Marcano (a book exploring the power of discipline in achieving your goals)  The End of Dieting: How to Live for Life by Dr. Dev Lucas M.D. or listen to The Litigain Podcast Episode 63: Understanding \"Nutritarian\" Eating w/Dr. Dev Lucas  Your Body in Balance: The New Science of Food, Hormones, and Health by Dr. Sanjiv Moss  The Menopause Diet Plan by Aurea Vee and Juliet Guerin  The Complete Guide to fasting by Dr. Dos Santos  Sugar, Salt & Fat by Mireille Vivar, Ph.D, R.D.  Weight Loss  Surgery Will Not Treat Food Addiction by Tracey Abarca Ph.D  The Game Changers- MyEveTabix Documentary on plant based nutrition  Fed Up - documentary about obesity (Free on Utube)  The Truth About Sugar - documentary on sugar (Free on Utube, https://youVirtual Sales Groupu.be/4F9vffpRO2g)  The Dr. Lee T5 Wellness Plan by Dr. Bo Lee MD  Fitlosophy Fitspiration - journal to better health (found at [employer] in fitness aisle)  What Happened to You?- a look at the impact trauma has on behavior written by Kyler Gracia and Dr. Trevon Rodriguez  Whole Again by Joselito Bowles - discovering your true self after trauma  Palmer Claudio talk on LegalGuru, The Call to Courage  Podcasts: The Exam Room by the Physician's Committee, Nutrition Facts by Dr. Loyola    We are here to support you with weight loss, but please remember that you still need your primary care provider for your routine health maintenance.

## 2024-03-04 RX ORDER — TIRZEPATIDE 5 MG/.5ML
5 INJECTION, SOLUTION SUBCUTANEOUS WEEKLY
Qty: 2 ML | Refills: 0 | Status: SHIPPED | OUTPATIENT
Start: 2024-03-04

## 2024-03-04 NOTE — TELEPHONE ENCOUNTER
Requesting zepbound 5mg  LOV: 2/12/24  RTC: 3 months  Filled: 1/30/24 #2ml with 0 refills    Future Appointments   Date Time Provider Department Center   3/30/2024  9:15 PM SCHEDULE BY DATE [facility]   4/29/2024  5:00 PM Fiordaliza Alfaro DO [facility] [address]   5/17/2024 10:00 AM Elsa Quintanilla APRN [facility]     Patient would like to stay on the 5mg dose of zepbound.

## 2024-03-27 RX ORDER — TIRZEPATIDE 5 MG/.5ML
5 INJECTION, SOLUTION SUBCUTANEOUS WEEKLY
Qty: 2 ML | Refills: 0 | Status: SHIPPED | OUTPATIENT
Start: 2024-03-27

## 2024-03-27 NOTE — TELEPHONE ENCOUNTER
Requesting   Requested Prescriptions     Pending Prescriptions Disp Refills    Tirzepatide-Weight Management (ZEPBOUND) 5 MG/0.5ML Subcutaneous Solution Auto-injector 2 mL 0     Sig: Inject 5 mg into the skin once a week.     LOV: 2/12/24  RTC: 3 months  Filled: 3/4/24 #2 with 0 refills    Future Appointments   Date Time Provider Department Center   4/29/2024  5:00 PM Fiordaliza Alfaro DO [facility] [address]   5/17/2024 10:00 AM Elsa Quintanilla APRN [facility]     Pt would like to stay an this dose

## 2024-05-01 RX ORDER — TIRZEPATIDE 7.5 MG/.5ML
7.5 INJECTION, SOLUTION SUBCUTANEOUS WEEKLY
Qty: 2 ML | Refills: 1 | Status: SHIPPED | OUTPATIENT
Start: 2024-05-01

## 2024-05-01 RX ORDER — TIRZEPATIDE 5 MG/.5ML
5 INJECTION, SOLUTION SUBCUTANEOUS WEEKLY
Qty: 2 ML | Refills: 0 | Status: CANCELLED | OUTPATIENT
Start: 2024-05-01

## 2024-05-01 NOTE — TELEPHONE ENCOUNTER
Requesting   Requested Prescriptions     Pending Prescriptions Disp Refills    Tirzepatide-Weight Management (ZEPBOUND) 5 MG/0.5ML Subcutaneous Solution Auto-injector 2 mL 0     Sig: Inject 5 mg into the skin once a week.       LOV: 02/12/2024  RTC: in about 3 months  Filled: 03/27/2024 #2ml with 0 refills    Future Appointments   Date Time Provider Department Center   5/16/2024  2:00 PM Elsa Quintanilla APRN [id number]     Good afternoon ,      Current weight is 147 lb.  Tolerating medication well , no side effects at this time .      Follow up  video scheduled for 5/16 .     I would like to try and increase dose if appropriate. Thank you .      Jennifer

## 2024-05-06 ENCOUNTER — PATIENT MESSAGE (OUTPATIENT)
Dept: INTERNAL MEDICINE CLINIC | Facility: CLINIC | Age: 34
End: 2024-05-06

## 2024-05-06 DIAGNOSIS — E66.9 OBESITY (BMI 30-39.9): Primary | ICD-10-CM

## 2024-05-06 RX ORDER — TIRZEPATIDE 2.5 MG/.5ML
2.5 INJECTION, SOLUTION SUBCUTANEOUS WEEKLY
Qty: 2 ML | Refills: 0 | Status: SHIPPED | OUTPATIENT
Start: 2024-05-06

## 2024-05-06 NOTE — TELEPHONE ENCOUNTER
From: Jennifer Pitts  To: Elsa Quintanilla  Sent: 5/6/2024 10:02 AM CDT  Subject: Weight loss     Good morning ,     I was not able to get the 7.5 of Zepbound filled of course as you may be aware of a shortage , I was hoping to get the 2.5 mg refilled as I would prefer not to be off of it if as the pharmacy is hoping June may be when shortage is up and I am about 7th on waiting list .     Please let me know your thoughts on this. I have an appointment scheduled in a few weeks .

## 2024-05-16 ENCOUNTER — TELEMEDICINE (OUTPATIENT)
Dept: INTERNAL MEDICINE CLINIC | Facility: CLINIC | Age: 34
End: 2024-05-16

## 2024-05-16 DIAGNOSIS — Z51.81 THERAPEUTIC DRUG MONITORING: Primary | ICD-10-CM

## 2024-05-16 DIAGNOSIS — E66.9 OBESITY (BMI 30-39.9): ICD-10-CM

## 2024-05-16 PROCEDURE — 99213 OFFICE O/P EST LOW 20 MIN: CPT | Performed by: NURSE PRACTITIONER

## 2024-05-16 NOTE — PATIENT INSTRUCTIONS
Next steps:  1.  Fill your prescribed medication and take as discussed and prescribed: zepbound 2.5mg weekly   2.  Schedule a personal nutrition consultation with one of our registered dieticians     Please try to work on the following dietary changes:    1.  Drink water with meals and throughout the day, cut down on soda and/or juice if consumed. Consider flavored water options like Bubbly, Spindrift, Hint and Ez.  2.  Eat breakfast daily and focus on having protein with each meal, examples include: greek yogurt, cottage cheese, hard boiled egg, whole grain toast with peanut butter.   3.  Reduce refined carbohydrates and sugars which includes items such as sweets, as well as rice, pasta, and bread and make sure to choose whole grain options when having them with just 1 serving per meal about the size of your inner palm.  4.  Consume non starchy veggies daily working towards making them a good 50% of your daily food intake. Add them to lunch and dinner consistently.  5.  Start a daily probiotic: VSL#3 is recommended, (order on line at www.vsl3.com). Take 1 capsule daily with water for 30 days, then reduce to 1 every other day (this will reduce the cost). Capsules can be left out for 2 weeks, but then must be refrigerated.      Please download richard My Fitness Pal, LoseIt! Or Net Diary to monitor daily dietary intake and you will be able to see if you are eating the right amount of calories or too much or too little which would hinder weight loss. Additionally this will help to see your daily carbohydrate and protein intake. When you set the richard up choose 1-2 lbs/week as a goal.  Keeping a paper food journal is an option as well to remain accountable for your choices- this is the start to mindful eating! A low calorie diet has been consistently shown to support weight loss.     Continue or start exercising to help establish a routine. If not already exercising begin with 1 day and progress as able with long-term  goal of 30 minutes 5 days a week at a minimum.     Meditation daily can help manage and control stress. Chronic stress can make weight loss difficult.  Exercising is one way to help with stress, but meditation using the CALM Caridad or another comparable alternative can be done in your home or place of work with little time commitment. This Caridad can also help work on behavior change and improve sleep. Check out the segment under Calm Masterclass and listen to The 4 Pillars of Health. A great way to begin learning about the foundation of lifestyle with practical tips to use in your every day.     Check out www.yourweightmatters.org blog for continued daily support and education along this weight loss journey!    Patient Resources:     Personal Training/Fitness Classes/Health Coaching     [facility] @ [email or web address]-center Full fitness center with group fitness and personal training. Discount available as client of [facility].  Health Coaching and Personal Training with January Beck at our [facility]- individual weekly coaching with option to add personal training and small group fitness classes targeted at weight loss- [phone] and/or email @ [email or web address]  [employer] [place] [email or web address]://www.[email or web address]. Group Fitness [phone] and/or email Carla [email or web address]  Franc\[facility] [place] [facility]\""ed Fitness Centers with multiple locations: Facile System (www.Palette), Eat The TrustID Fitness (www.Bump Technologies.FanBoom), Fit Body Bootcamp (www.Integrity ApplicationsbodybootSocial & Beyondp.FanBoom), Green Energy Options Fitness (www.Flow Studio), The Exercise  (www.exercisecoach.FanBoom)     Online Fitness  Fitness  on Utube  Fit in 10 DVD series- www.nchyl84YHM.com  Sit and Be Fit - Chair exercise series Www.sitandbefit.org  Hip Hop Fit with Channing Leone at www.hiphopfit.net     Apps for on the Go Fitness  [place] 7 Minute Workout  (orange box with white 7) - free on the go HIIT training caridad  Peloton Caridad @ www.onepeloton.com     Nutrition Trackers and Tools  LoseIT! And My Fitness Pal apps and on line for tracking nutrition  NOOM - virtual health coaching  FitFoundation (healthy meals on the go) in [place] @ www.nqgxwpmubzzgh6r.Diligent Board Member Services  Bistro MD @ www.bistromd.com and Jmnwlp00 (keto and low carb plans recommended) @ www.mqwzcl23.com, Metabolic Meals @ www.iikoMetabolicMeals.com - individual prepared meals to go  Gobble, Blue Apron, Home , Every Plate, Sunbasket- on line meal delivery programs for preparation at home  Meal Village in [place] for homemade meals to go @ www.mealvillage.Diligent Board Member Services  Diet Doctor @ www.dietdoctor.com - low carb swaps  Yummly - meal prep and planning caridad (www.yummly.com)     Stress Management/Behavior/Mindful Eating  CALM meditation caridad (www.calm.com)  Headspace  Am I Hungry? Mindful eating virtual  caridad  Www.yourweightmatters.org - Obesity Action Coalition sponsored Blog posts daily  Motivation caridad (black box with white \")- daily supportive messages sent to your phone     Books/Video Education/Podcasts  Mindless Eating by Luis E Landers  Why We Get Sick by Ori Horvath (a book about insulin resistance)  Atomic Habits by Randy Dockery (a book about taking small steps to promote greater behavior change)   Can't Hurt Me by Julian Marcano (a book exploring the power of discipline in achieving your goals)  The End of Dieting: How to Live for Life by Dr. Dev Lucas M.D. or listen to The Peppercoin Podcast Episode 63: Understanding \"Nutritarian\" Eating w/Dr. Dev Lucas  Your Body in Balance: The New Science of Food, Hormones, and Health by Dr. Sanjiv Moss  The Menopause Diet Plan by Aurea Vee and Juliet Guerin  The Complete Guide to fasting by Dr. Dos Santos  Sugar, Salt & Fat by Mireille Vivar, Ph.D, R.D.  Weight Loss Surgery Will Not Treat Food Addiction by Tracey Abarca Ph.D  The Game Changers- NetInventarium.mobi  Documentary on plant based nutrition  Fed Up - documentary about obesity (Free on Utube)  The Truth About Sugar - documentary on sugar (Free on Utube, https://youtu.be/3N7pcqyBP8h)  The Dr. Lee T5 Wellness Plan by Dr. Bo Lee MD  Fitlosophy Fitspiration - journal to better health (found at [employer] in fitness aisle)  What Happened to You?- a look at the impact trauma has on behavior written by Kyler Gracia and Dr. Trevon Rodriguez  Whole Again by Joselito Bowles - discovering your true self after trauma  Palmer Claudio talk on Voddler, The Call to Courage  Podcasts: The Exam Room by the Physician's Committee, Nutrition Facts by Dr. Loyola    We are here to support you with weight loss, but please remember that you still need your primary care provider for your routine health maintenance.

## 2024-05-16 NOTE — PROGRESS NOTES
[facility] WEIGHT MANAGEMENT VIRTUAL ENCOUNTER     Jennifer Pitts verbally consents to a Virtual/Telephone Check-In service on 05/16/24   Patient understands and accepts financial responsibility for any deductible, co-insurance and/or co-pays associated with this service.    HISTORY OF PRESENT ILLNESS  Chief Complaint   Patient presents with    Other     F/u on weight management      Jennifer Pitts is a 33 year old female is being evaluated as a video visit using Telemedicine with live, interactive video and audio    Weight gain/loss since LOV based on home monitoring:   Home scale: #146.2 lbs  Has lost  #12 lbs since LOV 3 months ago     Compliance with medication: zepbound 2.5mg weekly (couldn't get the 5mg- due to shortages)   Tolerating well, helping with decreasing appetite and no side effects     Feels like she was craving more on the lower doses of zepbound 2.5mg, felt better with the 5mg  Admits that she has been doing quicker meals (with kids sports)   Exercise has been reduced ...  Exercise/Activity: 2x/ week, via walking, 1 days per week- video, 1 day per week- strength training  Nutrition: eating regular meals, +protein, minimal veggies. interm tracking reports  Stress is manageable   Sleep: 6-7 hours/night, waking up feeling rested    Denies chest pain, shortness of breath, dizziness, blurred vision, headache, paresthesia, nausea/vomiting.     Wt Readings from Last 6 Encounters:   02/12/24 158 lb (71.7 kg)   06/27/23 166 lb (75.3 kg)   06/16/23 166 lb (75.3 kg)   07/06/22 173 lb (78.5 kg)   06/02/22 173 lb (78.5 kg)   03/02/22 180 lb 6.4 oz (81.8 kg)          Subjective  REVIEW OF SYSTEMS  GENERAL HEALTH: feels well otherwise, denied any fevers chills or night sweats   RESPIRATORY: denies shortness of breath   CARDIOVASCULAR: denies chest pain  GI: denies abdominal pain  PSYCH: denies any mood changes    Objective  EXAM  Reviewed most recent set of vitals   Physical Exam:  GENERAL: well  developed, well nourished, in no apparent distress, speaking in full sentences comfortably   SKIN: warm, pink, dry without rashes to exposed area   EYES: conjunctiva pink  HEENT: atraumatic, normocephalic  LUNGS: normal work of breathing, non labored  CARDIO: normal work, no exertion  EXTREMITIES: no cyanosis, no clubbing, no edema  NEURO: Oriented times three  PSYCH: pleasant, cooperative, normal mood and affect    Lab Results   Component Value Date    WBC 6.2 06/16/2023    RBC 4.65 06/16/2023    HGB 14.0 06/16/2023    HCT 42.9 06/16/2023    MCV 92.3 06/16/2023    MCH 30.1 06/16/2023    MCHC 32.6 06/16/2023    RDW 12.8 06/16/2023    .0 06/16/2023     Lab Results   Component Value Date    GLU 81 06/16/2023    BUN 12 06/16/2023    BUNCREA NOT APPLICABLE 06/02/2022    CREATSERUM 0.77 06/16/2023    ANIONGAP 5 06/16/2023    GFRNAA 93 06/02/2022    GFRAA 107 06/02/2022    CA 8.9 06/16/2023    OSMOCALC 285 06/16/2023    ALKPHO 50 06/16/2023    AST 22 06/16/2023    ALT 24 06/16/2023    BILT 0.4 06/16/2023    TP 7.4 06/16/2023    ALB 4.0 06/16/2023    GLOBULIN 3.4 06/16/2023    AGRATIO 1.8 06/02/2022     06/16/2023    K 4.3 06/16/2023     06/16/2023    CO2 26.0 06/16/2023     Lab Results   Component Value Date     06/16/2023    A1C 5.2 06/16/2023     Lab Results   Component Value Date    CHOLEST 171 06/16/2023    TRIG 77 06/16/2023    HDL 59 06/16/2023    LDL 98 06/16/2023    VLDL 13 06/16/2023    TCHDLRATIO 3.0 06/02/2022    NONHDLC 112 06/16/2023     Lab Results   Component Value Date    T4F 0.9 06/16/2023    TSH 1.670 06/16/2023    TSHT4 1.32 06/02/2022     Lab Results   Component Value Date    B12 658 06/16/2023    VITB12 446 06/02/2022     Lab Results   Component Value Date    VITD 29.6 (L) 06/16/2023       Current Outpatient Medications on File Prior to Visit   Medication Sig Dispense Refill    Tirzepatide-Weight Management (ZEPBOUND) 5 MG/0.5ML Subcutaneous Solution Auto-injector Inject 5 mg  into the skin once a week. 2 mL 0    Tirzepatide-Weight Management (ZEPBOUND) 2.5 MG/0.5ML Subcutaneous Solution Auto-injector Inject 2.5 mg into the skin once a week. 2 mL 0    Tirzepatide-Weight Management (ZEPBOUND) 7.5 MG/0.5ML Subcutaneous Solution Auto-injector Inject 7.5 mg into the skin once a week. 2 mL 1    hydrocortisone 2.5 % External Ointment APPLY TOPICALLY TO THE AFFECTED AREAS ON THE EYELIDS AND NECK TWICE DAILY FOR TEN DAYS      spironolactone 100 MG Oral Tab       ibuprofen 800 MG Oral Tab Take 1 tablet (800 mg total) by mouth every 8 (eight) hours as needed for Pain. 90 tablet 1    magnesium 250 MG Oral Tab Take 1 tablet (250 mg total) by mouth at bedtime.      L-Theanine 100 MG Oral Cap Take 1 capsule by mouth at bedtime.      5-Hydroxytryptophan (5-HTP) 100 MG Oral Cap Take 1 capsule by mouth at bedtime.      tiZANidine 4 MG Oral Tab Take 1 tablet (4 mg total) by mouth every 8 (eight) hours as needed. 30 tablet 2    PANTOPRAZOLE SODIUM 40 MG Oral Tab EC TAKE 1 TABLET BY MOUTH EVERY DAY IN THE MORNING BEFORE BREAKFAST (Patient taking differently: as needed.) 90 tablet 1    Tretinoin Microsphere (RETIN-A MICRO PUMP) 0.08 % External Gel Apply 1 Application topically nightly. To face and chest 50 g 4    levonorgestrel 20 MCG/24HR Intrauterine IUD 20 mcg (1 each total) by Intrauterine route once.       No current facility-administered medications on file prior to visit.       ASSESSMENT  Analyzed weight data:       Diagnoses and all orders for this visit:    Therapeutic drug monitoring    Obesity (BMI 30-39.9)        PLAN  Continue with medications: zepbound 2.5mg weekly (couldn't get the 5mg weekly)   --advised of side effects and adverse effects of this medication  Contradictions: is already on adderall/ was on vyvanse previously (no stimulant medications), phentermine in the past    Reviewed labs   Will continue with vitamin d OTC  Anxiety, stable, working with therapist   Back pain, can think  about doing aqua therapy (mentioned with last appt)  Wrote out macros and encouraged to track  Sleep hygiene, discussed ways to help with this and has sleep study scheduled next month   Advised to monitor blood pressure and pulse at home/ given parameters to review and contact provider.  Nutrition: low carb diet/ recommended to eat breakfast daily/ regular protein intake  Follow up with dietitian and psychologist as recommended.  Discussed the role of sleep and stress in weight management.  Counseled on comprehensive weight loss plan including attention to nutrition, exercise and behavior/stress management for success. See patient instruction below for more details.  Discussed strategies to overcome barriers to successful weight loss and weight maintenance  FITTE: ACSM recommendations (150-300 minutes/ week in active weight loss)       There are no Patient Instructions on file for this visit.    No follow-ups on file.    Patient verbalizes understanding.    Total time spent on chart review, pre-charting, obtaining history, counseling, and educating, reviewing labs was 22 minutes.       Pt understands phone/video evaluation is not a substitute for face to face examination or emergency care. Pt advised to go to the ER or call 911 for worsening symptoms or acute distress.       Please note that the following visit was completed using two-way, real-time interactive audio and/or video communication.  This has been done in good parth to provide continuity of care in the best interest of the provider-patient relationship, due to the ongoing public health crisis/national emergency and because of restrictions of visitation.  There are limitations of this visit as no physical exam could be performed.  Every conscious effort was taken to allow for sufficient and adequate time.  This billing was spent on reviewing labs, medications, radiology tests and decision making.  Appropriate medical decision-making and tests are ordered  as detailed in the plan of care above.     NOTE TO PATIENT: The 21st Century Cures Act makes clinical notes like these available to patients in the interest of transparency. Clinical notes are medical documents used by physicians and care providers to communicate with each other. These documents include medical language and terminology, abbreviations, and treatment information that may sound technical and at times possibly unfamiliar. In addition, at times, the verbiage may appear blunt or direct. These documents are one tool providers use to communicate relevant information and clinical opinions of the care providers in a way that allows common understanding of the clinical context.     Elsa Quintanilla, APRN  5/16/2024

## 2024-05-28 DIAGNOSIS — E66.9 OBESITY (BMI 30-39.9): Primary | ICD-10-CM

## 2024-05-29 RX ORDER — TIRZEPATIDE 5 MG/.5ML
5 INJECTION, SOLUTION SUBCUTANEOUS WEEKLY
Qty: 2 ML | Refills: 0 | Status: SHIPPED | OUTPATIENT
Start: 2024-05-29

## 2024-06-06 ENCOUNTER — PATIENT MESSAGE (OUTPATIENT)
Dept: FAMILY MEDICINE CLINIC | Facility: CLINIC | Age: 34
End: 2024-06-06

## 2024-06-06 DIAGNOSIS — Z79.899 MEDICATION MANAGEMENT: ICD-10-CM

## 2024-06-06 DIAGNOSIS — F90.2 ATTENTION DEFICIT HYPERACTIVITY DISORDER (ADHD), COMBINED TYPE: ICD-10-CM

## 2024-06-06 NOTE — TELEPHONE ENCOUNTER
A refill request was received for:  Requested Prescriptions     Pending Prescriptions Disp Refills    Lisdexamfetamine Dimesylate (VYVANSE) 50 MG Oral Chew Tab 30 tablet 0     Sig: Chew 50 mg by mouth daily.       Last refill date:   4/10/2024    Last office visit:  6/16/2023    Follow up due:  Future Appointments   Date Time Provider Department Center   9/9/2024  4:40 PM Elsa Quintanilla APRN [facility]

## 2024-06-06 NOTE — TELEPHONE ENCOUNTER
From: Jennifer Pitts  To: Fiordaliza Alfaro  Sent: 6/6/2024 8:03 AM CDT  Subject: Refill     Good morning ,   I am due for a refill on vyvanse 50 mg chewable .     Thank you .     Jennifer Pitts

## 2024-06-07 RX ORDER — LISDEXAMFETAMINE DIMESYLATE 50 MG/1
50 TABLET, CHEWABLE ORAL DAILY
Qty: 30 TABLET | Refills: 0 | Status: SHIPPED | OUTPATIENT
Start: 2024-06-07 | End: 2024-07-07

## 2024-07-03 DIAGNOSIS — E66.9 OBESITY (BMI 30-39.9): ICD-10-CM

## 2024-07-05 NOTE — TELEPHONE ENCOUNTER
Requesting   Requested Prescriptions     Pending Prescriptions Disp Refills    Tirzepatide-Weight Management (ZEPBOUND) 5 MG/0.5ML Subcutaneous Solution Auto-injector 2 mL 0     Sig: Inject 5 mg into the skin once a week.       LOV: 05/16/2024  RTC: in about 3 months  Filled: 05/29/2024 #2ml with 0 refills    Future Appointments   Date Time Provider Department Center   8/26/2024  9:00 AM Fiordaliza Alfaro,  [facility] [address]   9/9/2024  4:40 PM Elsa Quintanilla, [facility]     Good morning ,      Current weight : 144 lb   I am tolerating medication well, no side effects .   I would like to stay on current dose -5 mg       Thank you ,      Jennifer

## 2024-07-08 RX ORDER — TIRZEPATIDE 5 MG/.5ML
5 INJECTION, SOLUTION SUBCUTANEOUS WEEKLY
Qty: 2 ML | Refills: 1 | Status: SHIPPED | OUTPATIENT
Start: 2024-07-08

## 2024-08-26 ENCOUNTER — OFFICE VISIT (OUTPATIENT)
Dept: FAMILY MEDICINE CLINIC | Facility: CLINIC | Age: 34
End: 2024-08-26
Payer: COMMERCIAL

## 2024-08-26 VITALS
BODY MASS INDEX: 27.16 KG/M2 | HEIGHT: 60.5 IN | RESPIRATION RATE: 16 BRPM | OXYGEN SATURATION: 99 % | SYSTOLIC BLOOD PRESSURE: 112 MMHG | WEIGHT: 142 LBS | HEART RATE: 94 BPM | DIASTOLIC BLOOD PRESSURE: 68 MMHG

## 2024-08-26 DIAGNOSIS — F39 MOOD DISORDER (HCC): ICD-10-CM

## 2024-08-26 DIAGNOSIS — Z79.899 MEDICATION MANAGEMENT: ICD-10-CM

## 2024-08-26 DIAGNOSIS — Z00.00 ANNUAL PHYSICAL EXAM: Primary | ICD-10-CM

## 2024-08-26 DIAGNOSIS — M26.623 BILATERAL TEMPOROMANDIBULAR JOINT PAIN: ICD-10-CM

## 2024-08-26 DIAGNOSIS — I35.1 AORTIC VALVE INSUFFICIENCY, ETIOLOGY OF CARDIAC VALVE DISEASE UNSPECIFIED: ICD-10-CM

## 2024-08-26 DIAGNOSIS — F90.2 ATTENTION DEFICIT HYPERACTIVITY DISORDER (ADHD), COMBINED TYPE: ICD-10-CM

## 2024-08-26 DIAGNOSIS — R06.83 SNORING: ICD-10-CM

## 2024-08-26 RX ORDER — LISDEXAMFETAMINE DIMESYLATE 50 MG/1
50 TABLET, CHEWABLE ORAL DAILY
Qty: 30 TABLET | Refills: 0 | Status: SHIPPED | OUTPATIENT
Start: 2024-10-27 | End: 2024-11-26

## 2024-08-26 RX ORDER — LISDEXAMFETAMINE DIMESYLATE 50 MG/1
50 TABLET, CHEWABLE ORAL DAILY
Qty: 30 TABLET | Refills: 0 | Status: SHIPPED | OUTPATIENT
Start: 2024-08-26 | End: 2024-09-25

## 2024-08-26 RX ORDER — LISDEXAMFETAMINE DIMESYLATE 50 MG/1
50 TABLET, CHEWABLE ORAL DAILY
Qty: 30 TABLET | Refills: 0 | Status: SHIPPED | OUTPATIENT
Start: 2024-09-26 | End: 2024-10-26

## 2024-08-26 NOTE — PROGRESS NOTES
HPI:   Jennifer Pitts is a 33 year old female who presents for a complete physical exam.       last pap- Dr. Huitron 2022   all previous paps normal  No vaginal discharge  No bladder dysfunction  No menses   birth control- mirena    + performing self breast exams  last mammogram- never   No calcium and vit D supplementation  No family history of colon or ovarian cancer    P Grandmother - breast cancer      Going to weight loss clinic - on zepbound       Current Outpatient Medications   Medication Sig Dispense Refill    Tirzepatide-Weight Management (ZEPBOUND) 5 MG/0.5ML Subcutaneous Solution Auto-injector Inject 5 mg into the skin once a week. 2 mL 1    ZEPBOUND 5 MG/0.5ML Subcutaneous Solution Auto-injector ADMINISTER 5 MG UNDER THE SKIN 1 TIME A WEEK 2 mL 0    spironolactone 100 MG Oral Tab       ibuprofen 800 MG Oral Tab Take 1 tablet (800 mg total) by mouth every 8 (eight) hours as needed for Pain. 90 tablet 1    magnesium 250 MG Oral Tab Take 1 tablet (250 mg total) by mouth at bedtime.      L-Theanine 100 MG Oral Cap Take 1 capsule by mouth at bedtime.      tiZANidine 4 MG Oral Tab Take 1 tablet (4 mg total) by mouth every 8 (eight) hours as needed. 30 tablet 2    PANTOPRAZOLE SODIUM 40 MG Oral Tab EC TAKE 1 TABLET BY MOUTH EVERY DAY IN THE MORNING BEFORE BREAKFAST (Patient taking differently: as needed.) 90 tablet 1    Tretinoin Microsphere (RETIN-A MICRO PUMP) 0.08 % External Gel Apply 1 Application topically nightly. To face and chest 50 g 4    levonorgestrel 20 MCG/24HR Intrauterine IUD 20 mcg (1 each total) by Intrauterine route once.        Past Medical History:    Abdominal pain    Acne vulgaris    Anxiety    Arthritis    Attention deficit disorder    Back pain due to inflammatory process    L5-S1    Back problem    arthritis    Bloating    Body piercing    Constipation    Esophageal reflux    Flatulence/gas pain/belching    Head injury    Heart murmur    History of 2019 novel coronavirus disease  (COVID-19)    asymptomatic; no hospitalization; no lingering symptoms    Hx of motion sickness    Irregular bowel habits    Migraines    Missed  (HCC)    Night sweats    PONV (postoperative nausea and vomiting)    nausea    Sleep disturbance    Visual impairment    glasses/contacts      Past Surgical History:   Procedure Laterality Date    D & c  2015    Egd      Hand/finger surgery unlisted Right     Barber      Family History   Problem Relation Age of Onset    Lipids Father     Cancer Father         Prostate     High Blood Pressure Mother     Diabetes Maternal Grandmother     Hypertension Maternal Grandmother     Depression Maternal Grandmother     Other (lower back pain) Maternal Grandmother     Diabetes Paternal Grandmother     Hypertension Paternal Grandmother     Heart Disease Paternal Grandmother     Kidney Disease Paternal Grandmother       Social History:   Social History     Socioeconomic History    Marital status:    Tobacco Use    Smoking status: Never    Smokeless tobacco: Never   Vaping Use    Vaping status: Never Used   Substance and Sexual Activity    Alcohol use: Yes     Alcohol/week: 3.0 standard drinks of alcohol     Types: 3 Glasses of wine per week     Comment: socially    Drug use: No    Sexual activity: Yes     Partners: Male     Birth control/protection: Condom   Other Topics Concern    Caffeine Concern Yes     Comment: x2 daily    Exercise No    Seat Belt Yes   Social History Narrative    The patient does not use an assistive device..      The patient does live in a home with stairs.     Occ: . : . 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 [employer]   Exercise: 3-4 x per week  Diet: healthy     REVIEW OF SYSTEMS:   GENERAL: feels well otherwise  SKIN: denies any unusual skin lesions  EYES:denies blurred vision or double vision  HEENT: denies nasal congestion, sinus pain or ST  LUNGS: denies shortness of breath with exertion  CARDIOVASCULAR: denies chest pain on exertion  GI: denies  abdominal pain,denies heartburn  : denies dysuria, vaginal discharge or itching  MUSCULOSKELETAL: denies back pain  NEURO: denies headaches  PSYCHE: denies depression or anxiety  HEMATOLOGIC: denies hx of anemia  ENDOCRINE: denies thyroid history  ALL/ASTHMA: denies hx of allergy or asthma    EXAM:   /68   Pulse 94   Resp 16   Ht 5' 0.5\" (1.537 m)   Wt 142 lb (64.4 kg)   SpO2 99%   BMI 27.28 kg/m²   Body mass index is 27.28 kg/m².   GENERAL: alert and oriented X 3, well developed, well nourished,in no apparent distress  CARDIO: RRR 3/6 murmur  LUNGS: clear to auscultation  NECK: supple,no adenopathy,no thyromegaly  HEENT: atraumatic, normocephalic,ears and throat are clear  EYES:PERRLA, EOMI, normal,conjunctiva are clear  SKIN: norashes,no suspicious lesions  GI: good BS's,no masses, HSM or tenderness  MUSCULOSKELETAL: back is not tender,FROM of the back  EXTREMITIES: no cyanosis, clubbing or edema  NEURO: cranial nerves are intact,motor and sensory are grossly intact    ASSESSMENT AND PLAN:   Jennifer Pitts is a 33 year old female who presents for     1. Annual physical exam    - Vitamin D [E]; Future  - Free T4, (Free Thyroxine); Future  - Assay, Thyroid Stim Hormone; Future  - Lipid Panel; Future  - CBC With Differential With Platelet; Future  - Vitamin B12; Future  - Comp Metabolic Panel (14); Future  - Hemoglobin A1C; Future    2. Snoring    - Home Sleep Apnea Test (Adult pt only) - Sleep consult required for Medicare pts  - General sleep study; Future    3. Bilateral temporomandibular joint pain    - Physical Therapy Referral - [facility]      7. Aortic valve insufficiency, etiology of cardiac valve disease unspecified    - CARD ECHO 2D DOPPLER (CPT=93306); Future    7. Aortic valve insufficiency, etiology of cardiac valve disease unspecified    - CARD ECHO 2D DOPPLER (CPT=93306); Future      Discussed diet, exercise,calcium, vitamin D, fish oil and self breast exams.   Questions  answered and patient indicates understanding of these issues and agrees to the plan.  RTC in 6 months or sooner if needed        Jennifer Pitts is a 33 year old female here to discuss ADD     HPI:     well controlled  no SI/HI  No insomnia/poor sleep  Normal appetite  some anhedonia  No hopelessness  No anxiety or depression   Denies side effects  Weight has been stable  Therapy on hold but feels she may need to start soon     Current Outpatient Medications   Medication Sig Dispense Refill    Tirzepatide-Weight Management (ZEPBOUND) 5 MG/0.5ML Subcutaneous Solution Auto-injector Inject 5 mg into the skin once a week. 2 mL 1    ZEPBOUND 5 MG/0.5ML Subcutaneous Solution Auto-injector ADMINISTER 5 MG UNDER THE SKIN 1 TIME A WEEK 2 mL 0    spironolactone 100 MG Oral Tab       ibuprofen 800 MG Oral Tab Take 1 tablet (800 mg total) by mouth every 8 (eight) hours as needed for Pain. 90 tablet 1    magnesium 250 MG Oral Tab Take 1 tablet (250 mg total) by mouth at bedtime.      L-Theanine 100 MG Oral Cap Take 1 capsule by mouth at bedtime.      tiZANidine 4 MG Oral Tab Take 1 tablet (4 mg total) by mouth every 8 (eight) hours as needed. 30 tablet 2    PANTOPRAZOLE SODIUM 40 MG Oral Tab EC TAKE 1 TABLET BY MOUTH EVERY DAY IN THE MORNING BEFORE BREAKFAST (Patient taking differently: as needed.) 90 tablet 1    Tretinoin Microsphere (RETIN-A MICRO PUMP) 0.08 % External Gel Apply 1 Application topically nightly. To face and chest 50 g 4    levonorgestrel 20 MCG/24HR Intrauterine IUD 20 mcg (1 each total) by Intrauterine route once.        Past Medical History:    Abdominal pain    Acne vulgaris    Anxiety    Arthritis    Attention deficit disorder    Back pain due to inflammatory process    L5-S1    Back problem    arthritis    Bloating    Body piercing    Constipation    Esophageal reflux    Flatulence/gas pain/belching    Head injury    Heart murmur    History of 2019 novel coronavirus disease (COVID-19)    asymptomatic;  no hospitalization; no lingering symptoms    Hx of motion sickness    Irregular bowel habits    Migraines    Missed  (HCC)    Night sweats    PONV (postoperative nausea and vomiting)    nausea    Sleep disturbance    Visual impairment    glasses/contacts      Social History:  Social History     Socioeconomic History    Marital status:    Tobacco Use    Smoking status: Never    Smokeless tobacco: Never   Vaping Use    Vaping status: Never Used   Substance and Sexual Activity    Alcohol use: Yes     Alcohol/week: 3.0 standard drinks of alcohol     Types: 3 Glasses of wine per week     Comment: socially    Drug use: No    Sexual activity: Yes     Partners: Male     Birth control/protection: Condom   Other Topics Concern    Caffeine Concern Yes     Comment: x2 daily    Exercise No    Seat Belt Yes   Social History Narrative    The patient does not use an assistive device..      The patient does live in a home with stairs.        REVIEW OF SYSTEMS:   GENERAL HEALTH: feels well otherwise  SKIN: denies any unusual skin lesions or rashes  RESPIRATORY: denies shortness of breath with exertion  CARDIOVASCULAR: denies chest pain on exertion  GI: denies abdominal pain and denies heartburn  NEURO: denies headaches    EXAM:   /68   Pulse 94   Resp 16   Ht 5' 0.5\" (1.537 m)   Wt 142 lb (64.4 kg)   SpO2 99%   BMI 27.28 kg/m²   GENERAL: well developed, well nourished,in no apparent distress  PSYCH: normal affect, good eye contact, appropriate  NEURO: CN intact    ASSESSMENT AND PLAN:     4. Attention deficit hyperactivity disorder (ADHD), combined type    - Lisdexamfetamine Dimesylate (VYVANSE) 50 MG Oral Chew Tab; Chew 50 mg by mouth daily.  Dispense: 30 tablet; Refill: 0  - Lisdexamfetamine Dimesylate (VYVANSE) 50 MG Oral Chew Tab; Chew 50 mg by mouth daily.  Dispense: 30 tablet; Refill: 0  - Lisdexamfetamine Dimesylate (VYVANSE) 50 MG Oral Chew Tab; Chew 50 mg by mouth daily.  Dispense: 30 tablet;  Refill: 0    5. Medication management    - Lisdexamfetamine Dimesylate (VYVANSE) 50 MG Oral Chew Tab; Chew 50 mg by mouth daily.  Dispense: 30 tablet; Refill: 0  - Lisdexamfetamine Dimesylate (VYVANSE) 50 MG Oral Chew Tab; Chew 50 mg by mouth daily.  Dispense: 30 tablet; Refill: 0  - Lisdexamfetamine Dimesylate (VYVANSE) 50 MG Oral Chew Tab; Chew 50 mg by mouth daily.  Dispense: 30 tablet; Refill: 0    6. Mood disorder (HCC)    - Lisdexamfetamine Dimesylate (VYVANSE) 50 MG Oral Chew Tab; Chew 50 mg by mouth daily.  Dispense: 30 tablet; Refill: 0  - Lisdexamfetamine Dimesylate (VYVANSE) 50 MG Oral Chew Tab; Chew 50 mg by mouth daily.  Dispense: 30 tablet; Refill: 0  - Lisdexamfetamine Dimesylate (VYVANSE) 50 MG Oral Chew Tab; Chew 50 mg by mouth daily.  Dispense: 30 tablet; Refill: 0    The patient indicates understanding of these issues and agrees to the plan.  The patient is asked to return in 6 months or sooner if needed.

## 2024-08-27 DIAGNOSIS — E66.9 OBESITY (BMI 30-39.9): ICD-10-CM

## 2024-08-27 NOTE — TELEPHONE ENCOUNTER
Requesting   Requested Prescriptions     Pending Prescriptions Disp Refills    Tirzepatide-Weight Management (ZEPBOUND) 5 MG/0.5ML Subcutaneous Solution Auto-injector 2 mL 1     Sig: Inject 5 mg into the skin once a week.       LOV: 05/16/2024  RTC: in about 3 months  Filled: 07/08/2024 #2ml with 1 refills    Future Appointments   Date Time Provider Department Center   9/9/2024  4:40 PM Elsa Quintanilla, [facility]     Good afternoon ,   I am tolerating medication well without side effects ,  weight was 143 lb today .   I would like to continue the 5 mg dose as Elsa recommended .  Thank you ,   Jennifer

## 2024-08-28 DIAGNOSIS — E66.9 OBESITY (BMI 30-39.9): ICD-10-CM

## 2024-08-28 RX ORDER — TIRZEPATIDE 5 MG/.5ML
5 INJECTION, SOLUTION SUBCUTANEOUS WEEKLY
Qty: 2 ML | Refills: 1 | Status: SHIPPED | OUTPATIENT
Start: 2024-08-28 | End: 2024-08-28

## 2024-08-29 RX ORDER — TIRZEPATIDE 5 MG/.5ML
5 INJECTION, SOLUTION SUBCUTANEOUS WEEKLY
Qty: 2 ML | Refills: 1 | Status: SHIPPED | OUTPATIENT
Start: 2024-08-29

## 2024-08-29 NOTE — TELEPHONE ENCOUNTER
Requested Prescriptions     Pending Prescriptions Disp Refills    Tirzepatide-Weight Management (ZEPBOUND) 5 MG/0.5ML Subcutaneous Solution Auto-injector 2 mL 1     Sig: Inject 5 mg into the skin once a week.      LOV: 05/16/2024  RTC: in about 3 months  Filled: 07/08/2024 #2ml with 1 refills    Patient Comment: Please resend to [facility] , [facility] cost was too high , thank you .

## 2024-09-09 ENCOUNTER — OFFICE VISIT (OUTPATIENT)
Dept: INTERNAL MEDICINE CLINIC | Facility: CLINIC | Age: 34
End: 2024-09-09
Payer: COMMERCIAL

## 2024-09-09 VITALS
DIASTOLIC BLOOD PRESSURE: 76 MMHG | WEIGHT: 141 LBS | HEIGHT: 60.5 IN | SYSTOLIC BLOOD PRESSURE: 118 MMHG | RESPIRATION RATE: 18 BRPM | BODY MASS INDEX: 26.97 KG/M2 | HEART RATE: 70 BPM

## 2024-09-09 DIAGNOSIS — E66.9 OBESITY (BMI 30-39.9): ICD-10-CM

## 2024-09-09 DIAGNOSIS — Z51.81 THERAPEUTIC DRUG MONITORING: Primary | ICD-10-CM

## 2024-09-09 PROCEDURE — 3008F BODY MASS INDEX DOCD: CPT | Performed by: NURSE PRACTITIONER

## 2024-09-09 PROCEDURE — 3074F SYST BP LT 130 MM HG: CPT | Performed by: NURSE PRACTITIONER

## 2024-09-09 PROCEDURE — 99213 OFFICE O/P EST LOW 20 MIN: CPT | Performed by: NURSE PRACTITIONER

## 2024-09-09 PROCEDURE — 3078F DIAST BP <80 MM HG: CPT | Performed by: NURSE PRACTITIONER

## 2024-09-09 RX ORDER — TRETINOIN 1 MG/G
CREAM TOPICAL
COMMUNITY
Start: 2024-09-07

## 2024-09-09 RX ORDER — TIRZEPATIDE 7.5 MG/.5ML
7.5 INJECTION, SOLUTION SUBCUTANEOUS WEEKLY
Qty: 2 ML | Refills: 2 | Status: SHIPPED | OUTPATIENT
Start: 2024-09-09

## 2024-09-09 RX ORDER — SPIRONOLACTONE 50 MG/1
TABLET, FILM COATED ORAL
COMMUNITY
Start: 2024-06-30

## 2024-09-09 NOTE — PROGRESS NOTES
HISTORY OF PRESENT ILLNESS  Chief Complaint   Patient presents with    Weight Check     -5     Jennifer Pitts is a 34 year old female here for follow up with medical weight loss program for the treatment of overweight, obesity, or morbid obesity.     Down 5 lbs  Compliant on zepbound 5mg weekly   Tolerating well, helping with decreasing appetite and no side effects     Success: sleeping better at night, less pain  Challenging: food choices not always great, eating at regular times   Feels like she needs to start doing strength training  Thinks that she would like to increase zepbound    Exercise/Activity: 3x/ week, via walking, 1 days per week- video, 1 day per week- strength training  Nutrition: eating regular meals, +protein, minimal veggies. interm tracking reports  Meals out per week on average: 1  Stress is manageable   Sleep: 4 hours/night, waking up feeling tired    Denies chest pain, shortness of breath, dizziness, blurred vision, headache, paresthesia, nausea/vomiting.     Breakfast Lunch Dinner Snacks Fluids   Reviewed              Wt Readings from Last 6 Encounters:   09/09/24 141 lb (64 kg)   08/26/24 142 lb (64.4 kg)   02/12/24 158 lb (71.7 kg)   06/27/23 166 lb (75.3 kg)   06/16/23 166 lb (75.3 kg)   07/06/22 173 lb (78.5 kg)          REVIEW OF SYSTEMS  GENERAL: feels well otherwise, denied any fevers chills or night sweats   LUNGS: denies shortness of breath  CARDIOVASCULAR: denies chest pain  GI: denies abdominal pain  MUSCULOSKELETAL: denies back pain, joint pains   PSYCH: denies change in behavior or mood, denies feeling sad or depressed    EXAM  /76   Pulse 70   Resp 18   Ht 5' (1.524 m)   Wt 141 lb (64 kg)   BMI 27.54 kg/m²       GENERAL: well developed, well nourished, in no apparent distress, A/O x3  SKIN: no rashes, no suspicious lesions  HEENT: atraumatic, normocephalic, OP-clear, PERRLA  NECK: supple, no adenopathy  LUNGS: CTA in all fields, breathing non labored  CARDIO: RRR  without murmur  GI: +BS, NT/ND, no masses or HSM  EXTREMITIES: no cyanosis, no clubbing, no edema    Lab Results   Component Value Date    GLU 81 06/16/2023    BUN 12 06/16/2023    BUNCREA NOT APPLICABLE 06/02/2022    CREATSERUM 0.77 06/16/2023    ANIONGAP 5 06/16/2023    GFRNAA 93 06/02/2022    GFRAA 107 06/02/2022    CA 8.9 06/16/2023    OSMOCALC 285 06/16/2023    ALKPHO 50 06/16/2023    AST 22 06/16/2023    ALT 24 06/16/2023    BILT 0.4 06/16/2023    TP 7.4 06/16/2023    ALB 4.0 06/16/2023    GLOBULIN 3.4 06/16/2023    AGRATIO 1.8 06/02/2022     06/16/2023    K 4.3 06/16/2023     06/16/2023    CO2 26.0 06/16/2023     Lab Results   Component Value Date     06/16/2023    A1C 5.2 06/16/2023     Lab Results   Component Value Date    CHOLEST 171 06/16/2023    TRIG 77 06/16/2023    HDL 59 06/16/2023    LDL 98 06/16/2023    VLDL 13 06/16/2023    TCHDLRATIO 3.0 06/02/2022    NONHDLC 112 06/16/2023     Lab Results   Component Value Date    B12 658 06/16/2023    VITB12 446 06/02/2022     Lab Results   Component Value Date    VITD 29.6 (L) 06/16/2023       Current Outpatient Medications on File Prior to Visit   Medication Sig Dispense Refill    Probiotic Product (VSL#3 DS OR)       Tretinoin 0.1 % External Cream       spironolactone 50 MG Oral Tab       Tirzepatide-Weight Management (ZEPBOUND) 5 MG/0.5ML Subcutaneous Solution Auto-injector Inject 5 mg into the skin once a week. 2 mL 1    Lisdexamfetamine Dimesylate (VYVANSE) 50 MG Oral Chew Tab Chew 50 mg by mouth daily. 30 tablet 0    [START ON 9/26/2024] Lisdexamfetamine Dimesylate (VYVANSE) 50 MG Oral Chew Tab Chew 50 mg by mouth daily. 30 tablet 0    [START ON 10/27/2024] Lisdexamfetamine Dimesylate (VYVANSE) 50 MG Oral Chew Tab Chew 50 mg by mouth daily. 30 tablet 0    spironolactone 100 MG Oral Tab       ibuprofen 800 MG Oral Tab Take 1 tablet (800 mg total) by mouth every 8 (eight) hours as needed for Pain. 90 tablet 1    magnesium 250 MG Oral Tab  Take 1 tablet (250 mg total) by mouth at bedtime.      L-Theanine 100 MG Oral Cap Take 1 capsule by mouth at bedtime.      tiZANidine 4 MG Oral Tab Take 1 tablet (4 mg total) by mouth every 8 (eight) hours as needed. 30 tablet 2    PANTOPRAZOLE SODIUM 40 MG Oral Tab EC TAKE 1 TABLET BY MOUTH EVERY DAY IN THE MORNING BEFORE BREAKFAST (Patient taking differently: as needed.) 90 tablet 1    Tretinoin Microsphere (RETIN-A MICRO PUMP) 0.08 % External Gel Apply 1 Application topically nightly. To face and chest 50 g 4    levonorgestrel 20 MCG/24HR Intrauterine IUD 20 mcg (1 each total) by Intrauterine route once.      ZEPBOUND 5 MG/0.5ML Subcutaneous Solution Auto-injector ADMINISTER 5 MG UNDER THE SKIN 1 TIME A WEEK (Patient not taking: Reported on 9/9/2024) 2 mL 0     No current facility-administered medications on file prior to visit.       ASSESSMENT/PLAN    ICD-10-CM    1. Therapeutic drug monitoring  Z51.81       2. Obesity (BMI 30-39.9)  E66.9           PLAN   Initial Weight Data and Goal Weight Loss:  Initial consult: # 170lbs on 12/2023  Weight Calculations  Initial Weight: 170 lbs  Initial Weight Date: 12/01/23  Today's Weight: 141 lbs  5% Goal: 8.5  10% Goal: 17  Total Weight Loss: 29 lbs  Total weight loss: Down 5 lbs total, Net loss 29 lbs  Will increase medications: zepbound 7.5mg weekly   --advised of side effects and adverse effects of this medication  Contradictions:  is already on adderall/ was on vyvanse previously (no stimulant medications), phentermine in the past    Reviewed labs   Body composition: body fat 30.8%, visceral fat 6 and muscle mass 27.2 lbs  Will continue with vitamin d OTC  Anxiety, stable, working with therapist   Back pain, can think about doing aqua therapy (mentioned with last appt)  Wrote out macros and encouraged to track  Sleep hygiene, discussed ways to help with this and has sleep study scheduled next month  Nutrition: Low carb diet, recommended to eat breakfast daily/ regular  protein intake  Follow up with dietitian and psychologist as recommended.  Discussed the role of sleep and stress in weight management.  Counseled on comprehensive weight loss plan including attention to nutrition, exercise and behavior/stress management for success. See patient instruction below for more details.  Discussed strategies to overcome barriers to successful weight loss and weight maintenance  FITTE: ACSM recommendations (150-300 minutes/ week in active weight loss)   Weight Loss Consent to treat reviewed and signed.    Total time spent on chart review, pre-charting, obtaining history, counseling, and educating, reviewing labs was 24 minutes.       NOTE TO PATIENT: The 21st Century Cures Act makes clinical notes like these available to patients in the interest of transparency. Clinical notes are medical documents used by physicians and care providers to communicate with each other. These documents include medical language and terminology, abbreviations, and treatment information that may sound technical and at times possibly unfamiliar. In addition, at times, the verbiage may appear blunt or direct. These documents are one tool providers use to communicate relevant information and clinical opinions of the care providers in a way that allows common understanding of the clinical context.     There are no Patient Instructions on file for this visit.    No follow-ups on file.    Patient verbalizes understanding.    Elsa Quintanilla, APRN

## 2024-09-09 NOTE — PATIENT INSTRUCTIONS
Next steps:  1.  Fill your prescribed medication and take as discussed and prescribed: zepbound 7.5mg weekly   2.  Schedule a personal nutrition consultation with one of our registered dieticians     Please try to work on the following dietary changes:  Daily protein recommendation to start:  grams  Daily carbohydrate: <105g  Daily calories: 1,200-1,300  1.  Drink water with meals and throughout the day, cut down on soda and/or juice if consumed. Consider flavored water options like Bubbly, Spindrift, Hint and Ez.  2.  Eat breakfast daily and focus on having protein with each meal, examples include: greek yogurt, cottage cheese, hard boiled egg, whole grain toast with peanut butter.   3.  Reduce refined carbohydrates and sugars which includes items such as sweets, as well as rice, pasta, and bread and make sure to choose whole grain options when having them with just 1 serving per meal about the size of your inner palm.  4.  Consume non starchy veggies daily working towards making them a good 50% of your daily food intake. Add them to lunch and dinner consistently.  5.  Start a daily probiotic: VSL#3 is recommended, (order on line at www.vsl3.com). Take 1 capsule daily with water for 30 days, then reduce to 1 every other day (this will reduce the cost). Capsules can be left out for 2 weeks, but then must be refrigerated.      Please download richard My Fitness Pal, LoseIt! Or Net Diary to monitor daily dietary intake and you will be able to see if you are eating the right amount of calories or too much or too little which would hinder weight loss. Additionally this will help to see your daily carbohydrate and protein intake. When you set the richard up choose 1-2 lbs/week as a goal.  Keeping a paper food journal is an option as well to remain accountable for your choices- this is the start to mindful eating! A low calorie diet has been consistently shown to support weight loss.     Continue or start exercising to  help establish a routine. If not already exercising begin with 1 day and progress as able with long-term goal of 30 minutes 5 days a week at a minimum.     Meditation daily can help manage and control stress. Chronic stress can make weight loss difficult.  Exercising is one way to help with stress, but meditation using the CALM Caridad or another comparable alternative can be done in your home or place of work with little time commitment. This Caridad can also help work on behavior change and improve sleep. Check out the segment under Calm Masterclass and listen to The 4 Pillars of Health. A great way to begin learning about the foundation of lifestyle with practical tips to use in your every day.     Check out www.yourweightmatters.org blog for continued daily support and education along this weight loss journey!    Patient Resources:     Personal Training/Fitness Classes/Health Coaching     [facility] @ [email or web address] Full fitness center with group fitness and personal training. Discount available as client of [facility].  Health Coaching and Personal Training with January Beck at our [facility]- individual weekly coaching with option to add personal training and small group fitness classes targeted at weight loss- [phone] and/or email @ [email or web address]  360FIT [place] [email or web address]. Group Fitness [phone] and/or email Carla at [email or web address]  [facility] Fitness Centers with multiple locations: IPLSHOP Brasil (www.Morgan Solar), Eat The AccessPay Fitness (www.Aluwave.Cobiscorp), Fit Body Bootcamp (www.AmerityrebodyboMIKESTARp.Cobiscorp), Next New Networks (www.Laszlo Systems.Cobiscorp), The Exercise  (www.exercisecoach.Cobiscorp)     Online Fitness  Fitness  on Utube  Fit in 10 DVD series- www.mvavo43XKE.com  Sit and Be Fit - Chair exercise series Www.sitandbefit.org  Hip  Hop Fit with Channing Leone at www.hiphopfit.net     Apps for on the Go Fitness  [place] 7 Minute Workout (orange box with white 7) - free on the go HIIT training caridad  Peloton Caridad @ www.onepeloton.com     Nutrition Trackers and Tools  LoseIT! And My Fitness Pal apps and on line for tracking nutrition  NOOM - virtual health coaching  FitFoundation (healthy meals on the go) in [place] @ www.blukkwjmnpoui5y.Nuevora  Bistro MD @ [employer].bistromd.com and Qjyael13 (keto and low carb plans recommended) @ www.yncvef24.com, Metabolic Meals @ www.MyMetabolicMeals.com - individual prepared meals to go  Gobble, Blue Apron, Home , Every Plate, Sunbasket- on line meal delivery programs for preparation at home  Meal Village in [place] for homemade meals to go @ www.mealvillage.[place]  Diet Doctor @ www.dietdoctor.[place] - low carb swaps  YuDFine - meal prep and planning caridad (www.yummly.com)     Stress Management/Behavior/Mindful Eating  CALM meditation caridad (www.calm.com)  Headspace  Am I Hungry? Mindful eating virtual  caridad  Www.yourweightmatters.org - Obesity Action Coalition sponsored Blog posts daily  Motivation caridad (black box with white \")- daily supportive messages sent to your phone     Books/Video Education/Podcasts  Mindless Eating by Luis E Landers  Why We Get Sick by Ori Horvath (a book about insulin resistance)  Atomic Habits by Randy Dockery (a book about taking small steps to promote greater behavior change)   Can't Hurt Me by Julian Marcano (a book exploring the power of discipline in achieving your goals)  The End of Dieting: How to Live for Life by Dr. Dev Lucas M.D. or listen to The [employer] Podcast Episode 63: Understanding \"Nutritarian\" Eating w/Dr. Dev Lucas  Your Body in Balance: The New Science of Food, Hormones, and Health by Dr. Sanjiv Moss  The Menopause Diet Plan by Aurea Vee and Juliet Guerin  The Complete Guide to fasting by Dr. Dos Santos  Sugar, Salt & Fat by Mireille Vivar, Ph.D, R.D.  Weight  Loss Surgery Will Not Treat Food Addiction by Tracey Abarca Ph.D  The Game Changers- AgileMDix Documentary on plant based nutrition  Fed Up - documentary about obesity (Free on Utube)  The Truth About Sugar - documentary on sugar (Free on Utube, https://yourSmartu.be/2U3btfhTI6z)  The Dr. Lee T5 Wellness Plan by Dr. Bo Lee MD  Fitlosophy Fitspiration - journal to better health (found at [employer] in fitness aisle)  What Happened to You?- a look at the impact trauma has on behavior written by Kyler Gracia and Dr. Trevon Rodriguez  Whole Again by Joselito Bowles - discovering your true self after trauma  Palmer Claudio talk on [place] Beyond[employer], The Call to Courage  Podcasts: The Exam Room by the Physician's Committee, Nutrition Facts by Dr. Loyola    We are here to support you with weight loss, but please remember that you still need your primary care provider for your routine health maintenance.

## 2024-10-04 ENCOUNTER — TELEPHONE (OUTPATIENT)
Dept: INTERNAL MEDICINE CLINIC | Facility: CLINIC | Age: 34
End: 2024-10-04

## 2024-10-04 DIAGNOSIS — Z51.81 THERAPEUTIC DRUG MONITORING: ICD-10-CM

## 2024-10-04 DIAGNOSIS — E66.9 OBESITY (BMI 30-39.9): ICD-10-CM

## 2024-10-04 NOTE — TELEPHONE ENCOUNTER
Received a voicemail from [facility] in regards to patients Zepbound prescription    Received prescription on 8/29/24 and called today to clarify the dosage of the Zepbound    Pharmacy received 5 mg and 7.5 mg also    Please advise on dosage of patients prescription     Future Appointments   Date Time Provider Department Center   1/24/2025 12:20 PM Elsa Quintanilla APRN [facility]I [facility]   4/7/2025  4:40 PM Elsa Quintanilla APRN [facility]     Callback #: [phone]

## 2024-10-07 DIAGNOSIS — E66.9 OBESITY (BMI 30-39.9): ICD-10-CM

## 2024-10-07 NOTE — TELEPHONE ENCOUNTER
Requesting   Requested Prescriptions     Pending Prescriptions Disp Refills    Tirzepatide-Weight Management (ZEPBOUND) 7.5 MG/0.5ML Subcutaneous Solution Auto-injector 2 mL 2     Sig: Inject 7.5 mg into the skin once a week.       LOV: 09/09/2024  RTC: in about 5 months  Filled: *** #*** with *** refills    Future Appointments   Date Time Provider Department Center   1/24/2025 12:20 PM Elsa Quintanilla APRN [facility] [address]   4/7/2025  4:40 PM Elsa Quintanilla APRN [facility]GWEI [facility]

## 2024-10-08 RX ORDER — TIRZEPATIDE 7.5 MG/.5ML
7.5 INJECTION, SOLUTION SUBCUTANEOUS WEEKLY
Qty: 2 ML | Refills: 2 | OUTPATIENT
Start: 2024-10-08

## 2024-10-08 RX ORDER — TIRZEPATIDE 5 MG/.5ML
5 INJECTION, SOLUTION SUBCUTANEOUS WEEKLY
Qty: 2 ML | Refills: 1 | OUTPATIENT
Start: 2024-10-08

## 2024-10-08 NOTE — TELEPHONE ENCOUNTER
Requesting   Requested Prescriptions     Pending Prescriptions Disp Refills    Tirzepatide-Weight Management (ZEPBOUND) 5 MG/0.5ML Subcutaneous Solution Auto-injector 2 mL 1     Sig: Inject 5 mg into the skin once a week.       LOV: 0909/2024  RTC: 01/24/2025  Filled: *** #*** with *** refills    Future Appointments   Date Time Provider Department Center   1/24/2025 12:20 PM Elsa Quintanilla APRN [facility]   4/7/2025  4:40 PM Elsa Quintanilla APRN [facility]

## 2024-10-17 ENCOUNTER — PATIENT MESSAGE (OUTPATIENT)
Dept: INTERNAL MEDICINE CLINIC | Facility: CLINIC | Age: 34
End: 2024-10-17

## 2024-12-03 DIAGNOSIS — E66.9 OBESITY (BMI 30-39.9): ICD-10-CM

## 2024-12-03 RX ORDER — TIRZEPATIDE 5 MG/.5ML
5 INJECTION, SOLUTION SUBCUTANEOUS WEEKLY
Qty: 2 ML | Refills: 2 | Status: SHIPPED | OUTPATIENT
Start: 2024-12-03

## 2024-12-03 NOTE — TELEPHONE ENCOUNTER
Requesting   Requested Prescriptions     Pending Prescriptions Disp Refills    Tirzepatide-Weight Management (ZEPBOUND) 5 MG/0.5ML Subcutaneous Solution Auto-injector 2 mL 0     Sig: Inject 5 mg into the skin once a week.       LOV: 09/09/2024  RTC: in about 5 months  Filled: 09/09/2024 #2ml with 2 refills    Future Appointments   Date Time Provider Department Center   1/24/2025 12:20 PM Elsa Quintanilla APRN [facility]   4/7/2025  4:40 PM Elsa Quintanilla APRN [facility]     My current weight is 136.8lb.  I would not like to increase any further.    I find myself not having any appetite on the 7.5mg.    I did much better on 5 mg and would like to go back down to 5 mg as discussed during our last visit  please .  Thank you in advance .   Jennifer

## 2024-12-20 ENCOUNTER — PATIENT MESSAGE (OUTPATIENT)
Dept: FAMILY MEDICINE CLINIC | Facility: CLINIC | Age: 34
End: 2024-12-20

## 2024-12-20 NOTE — TELEPHONE ENCOUNTER
Please review previous message.Patient requesting Zofran for nausea due to migraines.  Last visit video visit 12/17/24

## 2024-12-21 ENCOUNTER — LAB ENCOUNTER (OUTPATIENT)
Dept: LAB | Age: 34
End: 2024-12-21
Attending: FAMILY MEDICINE
Payer: COMMERCIAL

## 2024-12-21 DIAGNOSIS — Z00.00 ANNUAL PHYSICAL EXAM: ICD-10-CM

## 2024-12-21 LAB
ALBUMIN SERPL-MCNC: 4.7 G/DL (ref 3.2–4.8)
ALBUMIN/GLOB SERPL: 1.6 {RATIO} (ref 1–2)
ALP LIVER SERPL-CCNC: 43 U/L
ALT SERPL-CCNC: 10 U/L
ANION GAP SERPL CALC-SCNC: 8 MMOL/L (ref 0–18)
AST SERPL-CCNC: 18 U/L (ref ?–34)
BASOPHILS # BLD AUTO: 0.03 X10(3) UL (ref 0–0.2)
BASOPHILS NFR BLD AUTO: 0.5 %
BILIRUB SERPL-MCNC: 0.7 MG/DL (ref 0.3–1.2)
BUN BLD-MCNC: 13 MG/DL (ref 9–23)
CALCIUM BLD-MCNC: 9.4 MG/DL (ref 8.7–10.4)
CHLORIDE SERPL-SCNC: 105 MMOL/L (ref 98–112)
CHOLEST SERPL-MCNC: 155 MG/DL (ref ?–200)
CO2 SERPL-SCNC: 26 MMOL/L (ref 21–32)
CREAT BLD-MCNC: 0.84 MG/DL
EGFRCR SERPLBLD CKD-EPI 2021: 92 ML/MIN/1.73M2 (ref 60–?)
EOSINOPHIL # BLD AUTO: 0.08 X10(3) UL (ref 0–0.7)
EOSINOPHIL NFR BLD AUTO: 1.2 %
ERYTHROCYTE [DISTWIDTH] IN BLOOD BY AUTOMATED COUNT: 12.6 %
EST. AVERAGE GLUCOSE BLD GHB EST-MCNC: 88 MG/DL (ref 68–126)
FASTING PATIENT LIPID ANSWER: YES
FASTING STATUS PATIENT QL REPORTED: YES
GLOBULIN PLAS-MCNC: 2.9 G/DL (ref 2–3.5)
GLUCOSE BLD-MCNC: 82 MG/DL (ref 70–99)
HBA1C MFR BLD: 4.7 % (ref ?–5.7)
HCT VFR BLD AUTO: 40.6 %
HDLC SERPL-MCNC: 46 MG/DL (ref 40–59)
HGB BLD-MCNC: 14 G/DL
IMM GRANULOCYTES # BLD AUTO: 0.01 X10(3) UL (ref 0–1)
IMM GRANULOCYTES NFR BLD: 0.2 %
LDLC SERPL CALC-MCNC: 96 MG/DL (ref ?–100)
LYMPHOCYTES # BLD AUTO: 2.16 X10(3) UL (ref 1–4)
LYMPHOCYTES NFR BLD AUTO: 32.5 %
MCH RBC QN AUTO: 30.9 PG (ref 26–34)
MCHC RBC AUTO-ENTMCNC: 34.5 G/DL (ref 31–37)
MCV RBC AUTO: 89.6 FL
MONOCYTES # BLD AUTO: 0.57 X10(3) UL (ref 0.1–1)
MONOCYTES NFR BLD AUTO: 8.6 %
NEUTROPHILS # BLD AUTO: 3.79 X10 (3) UL (ref 1.5–7.7)
NEUTROPHILS # BLD AUTO: 3.79 X10(3) UL (ref 1.5–7.7)
NEUTROPHILS NFR BLD AUTO: 57 %
NONHDLC SERPL-MCNC: 109 MG/DL (ref ?–130)
OSMOLALITY SERPL CALC.SUM OF ELEC: 287 MOSM/KG (ref 275–295)
PLATELET # BLD AUTO: 299 10(3)UL (ref 150–450)
POTASSIUM SERPL-SCNC: 3.9 MMOL/L (ref 3.5–5.1)
PROT SERPL-MCNC: 7.6 G/DL (ref 5.7–8.2)
RBC # BLD AUTO: 4.53 X10(6)UL
SODIUM SERPL-SCNC: 139 MMOL/L (ref 136–145)
T4 FREE SERPL-MCNC: 1.4 NG/DL (ref 0.8–1.7)
TRIGL SERPL-MCNC: 68 MG/DL (ref 30–149)
TSI SER-ACNC: 0.88 UIU/ML (ref 0.55–4.78)
VIT B12 SERPL-MCNC: 920 PG/ML (ref 211–911)
VIT D+METAB SERPL-MCNC: 39.4 NG/ML (ref 30–100)
VLDLC SERPL CALC-MCNC: 11 MG/DL (ref 0–30)
WBC # BLD AUTO: 6.6 X10(3) UL (ref 4–11)

## 2024-12-21 PROCEDURE — 84439 ASSAY OF FREE THYROXINE: CPT | Performed by: FAMILY MEDICINE

## 2024-12-21 PROCEDURE — 82607 VITAMIN B-12: CPT | Performed by: FAMILY MEDICINE

## 2024-12-21 PROCEDURE — 80050 GENERAL HEALTH PANEL: CPT | Performed by: FAMILY MEDICINE

## 2024-12-21 PROCEDURE — 83036 HEMOGLOBIN GLYCOSYLATED A1C: CPT | Performed by: FAMILY MEDICINE

## 2024-12-21 PROCEDURE — 82306 VITAMIN D 25 HYDROXY: CPT | Performed by: FAMILY MEDICINE

## 2024-12-21 PROCEDURE — 80061 LIPID PANEL: CPT | Performed by: FAMILY MEDICINE

## 2024-12-21 RX ORDER — ONDANSETRON 8 MG/1
8 TABLET, ORALLY DISINTEGRATING ORAL EVERY 8 HOURS PRN
Qty: 15 TABLET | Refills: 0 | Status: SHIPPED | OUTPATIENT
Start: 2024-12-21

## 2025-01-24 ENCOUNTER — TELEMEDICINE (OUTPATIENT)
Dept: INTERNAL MEDICINE CLINIC | Facility: CLINIC | Age: 35
End: 2025-01-24
Payer: COMMERCIAL

## 2025-01-24 DIAGNOSIS — E66.9 OBESITY (BMI 30-39.9): ICD-10-CM

## 2025-01-24 DIAGNOSIS — Z51.81 THERAPEUTIC DRUG MONITORING: Primary | ICD-10-CM

## 2025-01-24 PROCEDURE — 98005 SYNCH AUDIO-VIDEO EST LOW 20: CPT | Performed by: NURSE PRACTITIONER

## 2025-01-24 NOTE — PATIENT INSTRUCTIONS
Next steps:  1.  Fill your prescribed medication and take as discussed and prescribed: zepbound 5mg weekly   2.  Schedule a personal nutrition consultation with one of our registered dieticians     Please try to work on the following dietary changes:    1.  Drink water with meals and throughout the day, cut down on soda and/or juice if consumed. Consider flavored water options like Bubbly, Spindrift, Hint and Ez.  2.  Eat breakfast daily and focus on having protein with each meal, examples include: greek yogurt, cottage cheese, hard boiled egg, whole grain toast with peanut butter.   3.  Reduce refined carbohydrates and sugars which includes items such as sweets, as well as rice, pasta, and bread and make sure to choose whole grain options when having them with just 1 serving per meal about the size of your inner palm.  4.  Consume non starchy veggies daily working towards making them a good 50% of your daily food intake. Add them to lunch and dinner consistently.  5.  Start a daily probiotic: VSL#3 is recommended, (order on line at www.vsl3.com). Take 1 capsule daily with water for 30 days, then reduce to 1 every other day (this will reduce the cost). Capsules can be left out for 2 weeks, but then must be refrigerated.      Please download richard My Fitness Pal, LoseIt! Or Net Diary to monitor daily dietary intake and you will be able to see if you are eating the right amount of calories or too much or too little which would hinder weight loss. Additionally this will help to see your daily carbohydrate and protein intake. When you set the richard up choose 1-2 lbs/week as a goal.  Keeping a paper food journal is an option as well to remain accountable for your choices- this is the start to mindful eating! A low calorie diet has been consistently shown to support weight loss.     Continue or start exercising to help establish a routine. If not already exercising begin with 1 day and progress as able with long-term  goal of 30 minutes 5 days a week at a minimum.     Meditation daily can help manage and control stress. Chronic stress can make weight loss difficult.  Exercising is one way to help with stress, but meditation using the CALM Caridad or another comparable alternative can be done in your home or place of work with little time commitment. This Caridad can also help work on behavior change and improve sleep. Check out the segment under Calm Masterclass and listen to The 4 Pillars of Health. A great way to begin learning about the foundation of lifestyle with practical tips to use in your every day.     Check out www.yourweightmatters.org blog for continued daily support and education along this weight loss journey!    Patient Resources:     Personal Training/Fitness Classes/Health Coaching     [facility] @ [email or web address]-center Full fitness center with group fitness and personal training. Discount available as client of [facility].  Health Coaching and Personal Training with January Beck at our [facility]- individual weekly coaching with option to add personal training and small group fitness classes targeted at weight loss- [phone] and/or email @ [email or web address]  360FIT [place] [email or web address]. Group Fitness [phone] and/or email Carla [email or web address]  [facility] Fitness Centers with multiple locations: Lala (www.Lima), Eat The Squla Fitness (www.MedAptus.MyJobCompany), Fit Body Bootcamp (www.TextPowerbodybootQuail Surgical & Pain Management Centerp.MyJobCompany), Sojo Studios Fitness (www.Atreaon), The Exercise  (www.exercisecoach.MyJobCompany)     Online Fitness  Fitness  on Utube  Fit in 10 DVD series- www.dzhhr47AYE.com  Sit and Be Fit - Chair exercise series Www.sitandbefit.org  Hip Hop Fit with Channing Leone at www.hiphopfit.net     Apps for on the Go Fitness  [place] 7 Minute Workout  (orange box with white 7) - free on the go HIIT training caridad  Peloton Caridad @ www.onepeloton.com     Nutrition Trackers and Tools  LoseIT! And My Fitness Pal apps and on line for tracking nutrition  NOOM - virtual health coaching  FitFoundation (healthy meals on the go) in [place] @ www.nerxjrawyeodk8z.Therative  Bistro MD @ www.bistromd.com and Bfpnke48 (keto and low carb plans recommended) @ www.ivsobb09.com, Metabolic Meals @ www.Applect Learning Systems Pvt. Ltd.MetabolicMeals.com - individual prepared meals to go  Gobble, Blue Apron, Home , Every Plate, Sunbasket- on line meal delivery programs for preparation at home  Meal Village in [place] for homemade meals to go @ www.mealvillage.Therative  Diet Doctor @ www.dietdoctor.com - low carb swaps  Yummly - meal prep and planning caridad (www.yummly.com)     Stress Management/Behavior/Mindful Eating  CALM meditation caridad (www.calm.com)  Headspace  Am I Hungry? Mindful eating virtual  caridad  Www.yourweightmatters.org - Obesity Action Coalition sponsored Blog posts daily  Motivation caridad (black box with white \")- daily supportive messages sent to your phone     Books/Video Education/Podcasts  Mindless Eating by Luis E Landers  Why We Get Sick by Ori Horvath (a book about insulin resistance)  Atomic Habits by Randy Dockery (a book about taking small steps to promote greater behavior change)   Can't Hurt Me by Julian Marcano (a book exploring the power of discipline in achieving your goals)  The End of Dieting: How to Live for Life by Dr. Dev Lucas M.D. or listen to The ownCloud Podcast Episode 63: Understanding \"Nutritarian\" Eating w/Dr. Dev Lucas  Your Body in Balance: The New Science of Food, Hormones, and Health by Dr. Sanjiv Moss  The Menopause Diet Plan by Aurea Vee and Juliet Guerin  The Complete Guide to fasting by Dr. Dos Santos  Sugar, Salt & Fat by Mireille Vivar, Ph.D, R.D.  Weight Loss Surgery Will Not Treat Food Addiction by Tracey Abarca Ph.D  The Game Changers- NetDelaGet  Documentary on plant based nutrition  Fed Up - documentary about obesity (Free on Utube)  The Truth About Sugar - documentary on sugar (Free on Utube, https://youtu.be/7F5zhmvHK9n)  The Dr. Lee T5 Wellness Plan by Dr. Bo Lee MD  Fitlosophy Fitspiration - journal to better health (found at [employer] in fitness aisle)  What Happened to You?- a look at the impact trauma has on behavior written by Kyler Gracia and Dr. Trevon Rodriguez  Whole Again by Joselito Bowles - discovering your true self after trauma  Palmer Claudio talk on Tidal Wave Technology, The Call to Courage  Podcasts: The Exam Room by the Physician's Committee, Nutrition Facts by Dr. Loyola    We are here to support you with weight loss, but please remember that you still need your primary care provider for your routine health maintenance.

## 2025-01-24 NOTE — PROGRESS NOTES
[facility] WEIGHT MANAGEMENT [facility]IRTUAL ENCOUNTER     Jennifer Pitts verbally consents to a Virtual/Telephone Check-In service on 01/24/25   Patient understands and accepts financial responsibility for any deductible, co-insurance and/or co-pays associated with this service.    HISTORY OF PRESENT ILLNESS  Chief Complaint   Patient presents with    Other     F/u on weight management      Jennifer Pitts is a 34 year old female is being evaluated as a video visit using Telemedicine with live, interactive video and audio    Weight gain/loss since LOV based on home monitoring:   Home scale: # 135  Has lost  #6 lbs since LOV 4 months ago     Compliance with medication: zepbound 7.5mg week  Tolerating well, helping with decreasing appetite and felt like the higher dosage was too strong- is going back to 5mg     Started doing piliates   Hasn't been this weight in a long time   Success:     Reaching lowest weight as an adult   Challenging:     Work on clean eating , exercise   Exercise/Activity: 2x/ week, via walking, piliates, limited due to back pain  Nutrition: eating regular meals, +protein, minimal veggies. not tracking reports  Stress is manageable   Sleep: 5 hours/night, waking up feeling tired     Denies chest pain, shortness of breath, dizziness, blurred vision, headache, paresthesia, nausea/vomiting.     Wt Readings from Last 6 Encounters:   09/09/24 141 lb (64 kg)   08/26/24 142 lb (64.4 kg)   02/12/24 158 lb (71.7 kg)   06/27/23 166 lb (75.3 kg)   06/16/23 166 lb (75.3 kg)   07/06/22 173 lb (78.5 kg)          Subjective  REVIEW OF SYSTEMS  GENERAL HEALTH: feels well otherwise, denied any fevers chills or night sweats   RESPIRATORY: denies shortness of breath   CARDIOVASCULAR: denies chest pain  GI: denies abdominal pain  PSYCH: denies any mood changes    Objective  EXAM  Reviewed most recent set of vitals   Physical Exam:  GENERAL: well developed, well nourished, in no apparent distress, speaking in  full sentences comfortably   SKIN: warm, pink, dry without rashes to exposed area   EYES: conjunctiva pink  HEENT: atraumatic, normocephalic  LUNGS: normal work of breathing, non labored  CARDIO: normal work, no exertion  EXTREMITIES: no cyanosis, no clubbing, no edema  NEURO: Oriented times three  PSYCH: pleasant, cooperative, normal mood and affect    Lab Results   Component Value Date    WBC 6.6 12/21/2024    RBC 4.53 12/21/2024    HGB 14.0 12/21/2024    HCT 40.6 12/21/2024    MCV 89.6 12/21/2024    MCH 30.9 12/21/2024    MCHC 34.5 12/21/2024    RDW 12.6 12/21/2024    .0 12/21/2024     Lab Results   Component Value Date    GLU 82 12/21/2024    BUN 13 12/21/2024    BUNCREA NOT APPLICABLE 06/02/2022    CREATSERUM 0.84 12/21/2024    ANIONGAP 8 12/21/2024    GFRNAA 93 06/02/2022    GFRAA 107 06/02/2022    CA 9.4 12/21/2024    OSMOCALC 287 12/21/2024    ALKPHO 43 12/21/2024    AST 18 12/21/2024    ALT 10 12/21/2024    BILT 0.7 12/21/2024    TP 7.6 12/21/2024    ALB 4.7 12/21/2024    GLOBULIN 2.9 12/21/2024    AGRATIO 1.8 06/02/2022     12/21/2024    K 3.9 12/21/2024     12/21/2024    CO2 26.0 12/21/2024     Lab Results   Component Value Date    EAG 88 12/21/2024    A1C 4.7 12/21/2024     Lab Results   Component Value Date    CHOLEST 155 12/21/2024    TRIG 68 12/21/2024    HDL 46 12/21/2024    LDL 96 12/21/2024    VLDL 11 12/21/2024    TCHDLRATIO 3.0 06/02/2022    NONHDLC 109 12/21/2024     Lab Results   Component Value Date    T4F 1.4 12/21/2024    TSH 0.875 12/21/2024    TSHT4 1.32 06/02/2022     Lab Results   Component Value Date    B12 920 (H) 12/21/2024    VITB12 446 06/02/2022     Lab Results   Component Value Date    VITD 39.4 12/21/2024       Medications Ordered Prior to Encounter[1]    ASSESSMENT  Analyzed weight data:       Diagnoses and all orders for this visit:    Therapeutic drug monitoring    Obesity (BMI 30-39.9)        PLAN  Continue with medications: zepbound 5mg weekly   --advised  of side effects and adverse effects of this medication  Contradictions: is already on adderall/ was on vyvanse previously (no stimulant medications), phentermine in the past    Reviewed labs   Will continue with vitamin d OTC  Anxiety, stable, working with therapist   Back pain, can think about doing aqua therapy (mentioned with last appt)  Wrote out macros and encouraged to track  Advised to monitor blood pressure and pulse at home/ given parameters to review and contact provider.  Nutrition: low carb diet/ recommended to eat breakfast daily/ regular protein intake  Follow up with dietitian and psychologist as recommended.  Discussed the role of sleep and stress in weight management.  Counseled on comprehensive weight loss plan including attention to nutrition, exercise and behavior/stress management for success. See patient instruction below for more details.  Discussed strategies to overcome barriers to successful weight loss and weight maintenance  FITTE: ACSM recommendations (150-300 minutes/ week in active weight loss)       There are no Patient Instructions on file for this visit.    No follow-ups on file.    Patient verbalizes understanding.    Total time spent on chart review, pre-charting, obtaining history, counseling, and educating, reviewing labs was 23 minutes.       Pt understands phone/video evaluation is not a substitute for face to face examination or emergency care. Pt advised to go to the ER or call 911 for worsening symptoms or acute distress.       Please note that the following visit was completed using two-way, real-time interactive audio and/or video communication.  This has been done in good parth to provide continuity of care in the best interest of the provider-patient relationship, due to the ongoing public health crisis/national emergency and because of restrictions of visitation.  There are limitations of this visit as no physical exam could be performed.  Every conscious effort was  taken to allow for sufficient and adequate time.  This billing was spent on reviewing labs, medications, radiology tests and decision making.  Appropriate medical decision-making and tests are ordered as detailed in the plan of care above.     NOTE TO PATIENT: The 21st Century Cures Act makes clinical notes like these available to patients in the interest of transparency. Clinical notes are medical documents used by physicians and care providers to communicate with each other. These documents include medical language and terminology, abbreviations, and treatment information that may sound technical and at times possibly unfamiliar. In addition, at times, the verbiage may appear blunt or direct. These documents are one tool providers use to communicate relevant information and clinical opinions of the care providers in a way that allows common understanding of the clinical context.     Elsa Quintanilla, APRN  1/24/2025         [1]   Current Outpatient Medications on File Prior to Visit   Medication Sig Dispense Refill    ondansetron 8 MG Oral Tablet Dispersible Take 1 tablet (8 mg total) by mouth every 8 (eight) hours as needed for Nausea. 15 tablet 0    amphetamine-dextroamphetamine (ADDERALL) 10 MG Oral Tab Take 1 tablet (10 mg total) by mouth daily. 30 tablet 0    [START ON 2/17/2025] amphetamine-dextroamphetamine (ADDERALL) 10 MG Oral Tab Take 1 tablet (10 mg total) by mouth daily. 30 tablet 0    Lisdexamfetamine Dimesylate (VYVANSE) 50 MG Oral Chew Tab Chew 50 mg by mouth daily. 30 tablet 0    [START ON 2/17/2025] Lisdexamfetamine Dimesylate (VYVANSE) 50 MG Oral Chew Tab Chew 50 mg by mouth daily. 30 tablet 0    Tirzepatide-Weight Management (ZEPBOUND) 5 MG/0.5ML Subcutaneous Solution Auto-injector Inject 5 mg into the skin once a week. 2 mL 2    Probiotic Product (VSL#3 DS OR)       Tretinoin 0.1 % External Cream       spironolactone 50 MG Oral Tab       Tirzepatide-Weight Management (ZEPBOUND) 7.5 MG/0.5ML  Subcutaneous Solution Auto-injector Inject 7.5 mg into the skin once a week. 2 mL 2    Tirzepatide-Weight Management (ZEPBOUND) 5 MG/0.5ML Subcutaneous Solution Auto-injector Inject 5 mg into the skin once a week. 2 mL 1    spironolactone 100 MG Oral Tab       ibuprofen 800 MG Oral Tab Take 1 tablet (800 mg total) by mouth every 8 (eight) hours as needed for Pain. 90 tablet 1    magnesium 250 MG Oral Tab Take 1 tablet (250 mg total) by mouth at bedtime.      L-Theanine 100 MG Oral Cap Take 1 capsule by mouth at bedtime.      tiZANidine 4 MG Oral Tab Take 1 tablet (4 mg total) by mouth every 8 (eight) hours as needed. 30 tablet 2    PANTOPRAZOLE SODIUM 40 MG Oral Tab EC TAKE 1 TABLET BY MOUTH EVERY DAY IN THE MORNING BEFORE BREAKFAST (Patient taking differently: as needed.) 90 tablet 1    levonorgestrel 20 MCG/24HR Intrauterine IUD 20 mcg (1 each total) by Intrauterine route once.       No current facility-administered medications on file prior to visit.

## 2025-02-18 DIAGNOSIS — G43.019 INTRACTABLE MIGRAINE WITHOUT AURA AND WITHOUT STATUS MIGRAINOSUS: ICD-10-CM

## 2025-02-18 RX ORDER — IBUPROFEN 800 MG/1
800 TABLET, FILM COATED ORAL EVERY 8 HOURS PRN
Qty: 90 TABLET | Refills: 1 | Status: SHIPPED | OUTPATIENT
Start: 2025-02-18

## 2025-02-25 DIAGNOSIS — E66.9 OBESITY (BMI 30-39.9): ICD-10-CM

## 2025-02-25 NOTE — TELEPHONE ENCOUNTER
Requesting   Requested Prescriptions     Pending Prescriptions Disp Refills    ZEPBOUND 5 MG/0.5ML Subcutaneous Solution Auto-injector [Pharmacy Med Name: Zepbound Subcutaneous Solution Auto-injector 5 MG/0.5ML] 2 mL 0     Sig: Inject 5 mg into the skin once a week.       LOV: 01/24/2025  RTC: n/a  Filled: 12/03/2024 #2ml with 2 refills    Future Appointments   Date Time Provider Department Center   4/7/2025  4:40 PM Elsa Quintanilla, [facility]     Hi Nilam  ,   I would like to stay on 5 mg at this time, I can tolerate the 5 mg very well . Most recent weight was 137lb.      I do not wish to move at this time , I informed Elsa  last visit what  I tried the 7.5 but could not tolerate that dose .      Thank you !   Jennifer

## 2025-02-26 RX ORDER — TIRZEPATIDE 5 MG/.5ML
5 INJECTION, SOLUTION SUBCUTANEOUS WEEKLY
Qty: 2 ML | Refills: 2 | Status: SHIPPED | OUTPATIENT
Start: 2025-02-26

## 2025-04-07 ENCOUNTER — OFFICE VISIT (OUTPATIENT)
Dept: INTERNAL MEDICINE CLINIC | Facility: CLINIC | Age: 35
End: 2025-04-07
Payer: COMMERCIAL

## 2025-04-07 VITALS
WEIGHT: 138 LBS | RESPIRATION RATE: 18 BRPM | DIASTOLIC BLOOD PRESSURE: 78 MMHG | HEIGHT: 60.5 IN | BODY MASS INDEX: 26.4 KG/M2 | HEART RATE: 97 BPM | SYSTOLIC BLOOD PRESSURE: 114 MMHG | OXYGEN SATURATION: 99 %

## 2025-04-07 DIAGNOSIS — E66.9 OBESITY (BMI 30-39.9): ICD-10-CM

## 2025-04-07 DIAGNOSIS — Z51.81 THERAPEUTIC DRUG MONITORING: Primary | ICD-10-CM

## 2025-04-07 PROCEDURE — 3074F SYST BP LT 130 MM HG: CPT | Performed by: NURSE PRACTITIONER

## 2025-04-07 PROCEDURE — 3008F BODY MASS INDEX DOCD: CPT | Performed by: NURSE PRACTITIONER

## 2025-04-07 PROCEDURE — 99214 OFFICE O/P EST MOD 30 MIN: CPT | Performed by: NURSE PRACTITIONER

## 2025-04-07 PROCEDURE — 3078F DIAST BP <80 MM HG: CPT | Performed by: NURSE PRACTITIONER

## 2025-04-07 NOTE — PROGRESS NOTES
HISTORY OF PRESENT ILLNESS  Chief Complaint   Patient presents with    Weight Check     Up 3 lb(1/24/25 tele)     Jennifer Pitts is a 34 year old female here for follow up with medical weight loss program for the treatment of overweight, obesity, or morbid obesity.     Up #3 lbs (f/u from 1/2025 via telemedicine)   Compliant on zepbound 5mg week   Tolerating well, helping with decreasing appetite and no side effects     Admits during winter, wasn't moving as much   Doesn't get hungry until 2pm in the afternoon (still taking vyvanse in the AM from PCP)   Calories: 1,300-1,400  Protein: 74+  Carbs/sugars: 45-100g    Has been sleeping better with weight loss   Success: maintaining weight loss  Challenging: reducing appetite making it harder to eat volume/increase protein   Exercise/Activity: 2x/ week, via walking, piliates, limited due to back pain  Nutrition: eating regular meals, +protein, minimal veggies. + tracking reports  Meals out per week on average: 4 (more since spring break with kids)  Stress is manageable   Sleep: 4-6 hours/night, waking up feeling tired (back pain, bathroom)     Denies chest pain, shortness of breath, dizziness, blurred vision, headache, paresthesia, nausea/vomiting.     Breakfast Lunch Dinner Snacks Fluids   Reviewed              Wt Readings from Last 6 Encounters:   04/07/25 138 lb (62.6 kg)   09/09/24 141 lb (64 kg)   08/26/24 142 lb (64.4 kg)   02/12/24 158 lb (71.7 kg)   06/27/23 166 lb (75.3 kg)   06/16/23 166 lb (75.3 kg)          REVIEW OF SYSTEMS  GENERAL: feels well otherwise, denied any fevers chills or night sweats   LUNGS: denies shortness of breath  CARDIOVASCULAR: denies chest pain  GI: denies abdominal pain  MUSCULOSKELETAL: +back pain, joint pains   PSYCH: denies change in behavior or mood, denies feeling sad or depressed    EXAM  /78   Pulse 97   Resp 18   Ht 5' 0.5\" (1.537 m)   Wt 138 lb (62.6 kg)   SpO2 99%   BMI 26.51 kg/m²       GENERAL: well  developed, well nourished, in no apparent distress, A/O x3  SKIN: no rashes, no suspicious lesions  HEENT: atraumatic, normocephalic, OP-clear, PERRLA  NECK: supple, no adenopathy  LUNGS: CTA in all fields, breathing non labored  CARDIO: RRR without murmur  GI: +BS, NT/ND, no masses or HSM  EXTREMITIES: no cyanosis, no clubbing, no edema    Lab Results   Component Value Date    GLU 82 12/21/2024    BUN 13 12/21/2024    BUNCREA NOT APPLICABLE 06/02/2022    CREATSERUM 0.84 12/21/2024    ANIONGAP 8 12/21/2024    GFRNAA 93 06/02/2022    GFRAA 107 06/02/2022    CA 9.4 12/21/2024    OSMOCALC 287 12/21/2024    ALKPHO 43 12/21/2024    AST 18 12/21/2024    ALT 10 12/21/2024    BILT 0.7 12/21/2024    TP 7.6 12/21/2024    ALB 4.7 12/21/2024    GLOBULIN 2.9 12/21/2024    AGRATIO 1.8 06/02/2022     12/21/2024    K 3.9 12/21/2024     12/21/2024    CO2 26.0 12/21/2024     Lab Results   Component Value Date    EAG 88 12/21/2024    A1C 4.7 12/21/2024     Lab Results   Component Value Date    CHOLEST 155 12/21/2024    TRIG 68 12/21/2024    HDL 46 12/21/2024    LDL 96 12/21/2024    VLDL 11 12/21/2024    TCHDLRATIO 3.0 06/02/2022    NONHDLC 109 12/21/2024     Lab Results   Component Value Date    B12 920 (H) 12/21/2024    VITB12 446 06/02/2022     Lab Results   Component Value Date    VITD 39.4 12/21/2024       Medications Ordered Prior to Encounter[1]    ASSESSMENT/PLAN    ICD-10-CM    1. Therapeutic drug monitoring  Z51.81       2. Obesity (BMI 30-39.9)  E66.9           PLAN   Initial Weight Data and Goal Weight Loss:  Initial consult: # 170lbs on 12/2023  Weight Calculations  Initial Weight: 170 lbs  Initial Weight Date: 12/01/23  Today's Weight: 138 lbs  5% Goal: 8.5  10% Goal: 17  Total Weight Loss: 32 lbs  Total weight loss: up #2 lbs total, Net loss 32 lbs  Continue with medications: zepbound 5mg weekly - will stay at this dosage-but mentioned if she feels like she is under eating then we should reduce the  dose  --advised of side effects and adverse effects of this medication  Contradictions: is already on adderall/ was on vyvanse previously (no stimulant medications), phentermine in the past    Reviewed labs   Will continue with vitamin d OTC  Anxiety, stable, working with therapist   Back pain, can think about doing aqua therapy (mentioned with last appt)  Wrote out macros and encouraged to track  Nutrition: Low carb diet, recommended to eat breakfast daily/ regular protein intake  Follow up with dietitian and psychologist as recommended.  Discussed the role of sleep and stress in weight management.  Counseled on comprehensive weight loss plan including attention to nutrition, exercise and behavior/stress management for success. See patient instruction below for more details.  Discussed strategies to overcome barriers to successful weight loss and weight maintenance  FITTE: ACSM recommendations (150-300 minutes/ week in active weight loss)   Weight Loss Consent to treat reviewed and signed.    Total time spent on chart review, pre-charting, obtaining history, counseling, and educating, reviewing labs was 30 minutes.       NOTE TO PATIENT: The 21st Century Cures Act makes clinical notes like these available to patients in the interest of transparency. Clinical notes are medical documents used by physicians and care providers to communicate with each other. These documents include medical language and terminology, abbreviations, and treatment information that may sound technical and at times possibly unfamiliar. In addition, at times, the verbiage may appear blunt or direct. These documents are one tool providers use to communicate relevant information and clinical opinions of the care providers in a way that allows common understanding of the clinical context.     There are no Patient Instructions on file for this visit.    No follow-ups on file.    Patient verbalizes understanding.    Elsa Quintanilla, APRN              [1]   Current Outpatient Medications on File Prior to Visit   Medication Sig Dispense Refill    Tirzepatide-Weight Management (ZEPBOUND) 5 MG/0.5ML Subcutaneous Solution Auto-injector Inject 5 mg into the skin once a week. 2 mL 2    ibuprofen 800 MG Oral Tab Take 1 tablet (800 mg total) by mouth every 8 (eight) hours as needed for Pain. 90 tablet 1    ondansetron 8 MG Oral Tablet Dispersible Take 1 tablet (8 mg total) by mouth every 8 (eight) hours as needed for Nausea. 15 tablet 0    Probiotic Product (VSL#3 DS OR)       Tretinoin 0.1 % External Cream       spironolactone 50 MG Oral Tab       spironolactone 100 MG Oral Tab       magnesium 250 MG Oral Tab Take 1 tablet (250 mg total) by mouth at bedtime.      tiZANidine 4 MG Oral Tab Take 1 tablet (4 mg total) by mouth every 8 (eight) hours as needed. 30 tablet 2    PANTOPRAZOLE SODIUM 40 MG Oral Tab EC TAKE 1 TABLET BY MOUTH EVERY DAY IN THE MORNING BEFORE BREAKFAST 90 tablet 1    levonorgestrel 20 MCG/24HR Intrauterine IUD 20 mcg (1 each total) by Intrauterine route once.      Tirzepatide-Weight Management (ZEPBOUND) 7.5 MG/0.5ML Subcutaneous Solution Auto-injector Inject 7.5 mg into the skin once a week. (Patient not taking: Reported on 4/7/2025) 2 mL 2    Tirzepatide-Weight Management (ZEPBOUND) 5 MG/0.5ML Subcutaneous Solution Auto-injector Inject 5 mg into the skin once a week. 2 mL 1    L-Theanine 100 MG Oral Cap Take 1 capsule by mouth at bedtime. (Patient not taking: Reported on 4/7/2025)       No current facility-administered medications on file prior to visit.

## 2025-04-07 NOTE — PATIENT INSTRUCTIONS
Next steps:  1.  Fill your prescribed medication and take as discussed and prescribed: Zepbound 5 mg weekly  2.  Schedule a personal nutrition consultation with one of our registered dieticians     Please try to work on the following dietary changes:  Daily protein recommendation to start:  grams  Daily carbohydrate: <100g  Daily calories: 1,300  1.  Drink water with meals and throughout the day, cut down on soda and/or juice if consumed. Consider flavored water options like Bubbly, Spindrift, Hint and Ez.  2.  Eat breakfast daily and focus on having protein with each meal, examples include: greek yogurt, cottage cheese, hard boiled egg, whole grain toast with peanut butter.   3.  Reduce refined carbohydrates and sugars which includes items such as sweets, as well as rice, pasta, and bread and make sure to choose whole grain options when having them with just 1 serving per meal about the size of your inner palm.  4.  Consume non starchy veggies daily working towards making them a good 50% of your daily food intake. Add them to lunch and dinner consistently.  5.  Start a daily probiotic: VSL#3 is recommended, (order on line at www.vsl3.com). Take 1 capsule daily with water for 30 days, then reduce to 1 every other day (this will reduce the cost). Capsules can be left out for 2 weeks, but then must be refrigerated.      Please download richard My Fitness Pal, LoseIt! Or Net Diary to monitor daily dietary intake and you will be able to see if you are eating the right amount of calories or too much or too little which would hinder weight loss. Additionally this will help to see your daily carbohydrate and protein intake. When you set the richard up choose 1-2 lbs/week as a goal.  Keeping a paper food journal is an option as well to remain accountable for your choices- this is the start to mindful eating! A low calorie diet has been consistently shown to support weight loss.     Continue or start exercising to help  establish a routine. If not already exercising begin with 1 day and progress as able with long-term goal of 30 minutes 5 days a week at a minimum.     Meditation daily can help manage and control stress. Chronic stress can make weight loss difficult.  Exercising is one way to help with stress, but meditation using the CALM Caridad or another comparable alternative can be done in your home or place of work with little time commitment. This Caridad can also help work on behavior change and improve sleep. Check out the segment under Calm Masterclass and listen to The 4 Pillars of Health. A great way to begin learning about the foundation of lifestyle with practical tips to use in your every day.     Check out www.yourweightmatters.org blog for continued daily support and education along this weight loss journey!    Patient Resources:     Personal Training/Fitness Classes/Health Coaching     [facility] @ [email or web address] Full fitness center with group fitness and personal training. Discount available as client of [facility].  Health Coaching and Personal Training with January Beck at our [facility]- individual weekly coaching with option to add personal training and small group fitness classes targeted at weight loss- [phone] and/or email @ [email or web address]  [employer] [place] [email or web address] Group Fitness [phone] and/or email Carla at [email or web address]  [facility] Fitness Centers with multiple locations: Dokogeo (www.Qwilt), Eat The Wright [employer] Products Fitness (www.MobiDough.BigTip), Fit Body Bootcamp (www.iGrez LLCbodybootHooftyMatchp.BigTip), MicksGarage (www.Managed by Q.BigTip), The Exercise  (www.exercisecoach.BigTip)     Online Fitness  Fitness  on Utube  Fit in 10 DVD series- www.zuabv02FJG.com  Sit and Be Fit - Chair exercise series Www.sitandbefit.org  Hip Hop  Fit with Gene Leone at www.hiphopfit.net     Apps for on the Go Fitness  [place] 7 Minute Workout (orange box with white 7) - free on the go HIIT training caridad  Peloton Caridad @ www.onepeloton.com     Nutrition Trackers and Tools  LoseIT! And My Fitness Pal apps and on line for tracking nutrition  NOOM - virtual health coaching  FitFoundation (healthy meals on the go) in [place] @ www.lobrbdtrbmccd0v.DialedIN  Bistro MD @ IEVbistromd.com and Wnqjfn93 (keto and low carb plans recommended) @ www.qwfcde78.com, Metabolic Meals @ www.MyMetabolicMeals.com - individual prepared meals to go  Gobble, Blue Apron, Home , Every Plate, Sunbasket- on line meal delivery programs for preparation at home  Meal Village in [place] for homemade meals to go @ www.mealvillage.DialedIN  Diet Doctor @ www.dietdoctor.DialedIN - low carb swaps  YuReally Cheap Geeks - meal prep and planning caridad (www.yummly.com)     Stress Management/Behavior/Mindful Eating  CALM meditation caridad (www.calm.com)  Headspace  Am I Hungry? Mindful eating virtual  caridad  Www.yourweightmatters.org - Obesity Action Coalition sponsored Blog posts daily  Motivation cariadd (black box with white \")- daily supportive messages sent to your phone     Books/Video Education/Podcasts  Mindless Eating by Luis E Landers  Why We Get Sick by Ori Horvath (a book about insulin resistance)  Atomic Habits by Randy Dockery (a book about taking small steps to promote greater behavior change)   Can't Hurt Me by Julian Marcano (a book exploring the power of discipline in achieving your goals)  The End of Dieting: How to Live for Life by Dr. Dev Lucas M.D. or listen to The Tymphany Podcast Episode 63: Understanding \"Nutritarian\" Eating w/Dr. Dev Lucas  Your Body in Balance: The New Science of Food, Hormones, and Health by Dr. Sanjiv Moss  The Menopause Diet Plan by Aurea Vee and Juliet Guerin  The Complete Guide to fasting by Dr. Dos Santos  Sugar, Salt & Fat by Mireille Vivar, Ph.D, R.D.  Weight Loss  Surgery Will Not Treat Food Addiction by Tracey Abarca Ph.D  The Game Changers- MiTu Networkix Documentary on plant based nutrition  Fed Up - documentary about obesity (Free on Utube)  The Truth About Sugar - documentary on sugar (Free on Utube, https://youFieldoou.be/4K9onxfTJ8o)  The Dr. Lee T5 Wellness Plan by Dr. Bo Lee MD  Fitlosophy Fitspiration - journal to better health (found at [employer] in fitness aisle)  What Happened to You?- a look at the impact trauma has on behavior written by Kyler Gracia and Dr. Trevon Rodriguez  Whole Again by Joselito Bowles - discovering your true self after trauma  Palmer Claudio talk on PlaySquare, The Call to Courage  Podcasts: The Exam Room by the Physician's Committee, Nutrition Facts by Dr. Loyola    We are here to support you with weight loss, but please remember that you still need your primary care provider for your routine health maintenance.

## 2025-04-17 ENCOUNTER — PATIENT MESSAGE (OUTPATIENT)
Dept: FAMILY MEDICINE CLINIC | Facility: CLINIC | Age: 35
End: 2025-04-17

## 2025-04-17 DIAGNOSIS — F90.2 ATTENTION DEFICIT HYPERACTIVITY DISORDER (ADHD), COMBINED TYPE: ICD-10-CM

## 2025-04-17 RX ORDER — LISDEXAMFETAMINE DIMESYLATE 50 MG/1
50 TABLET, CHEWABLE ORAL DAILY
Qty: 30 TABLET | Refills: 0 | Status: SHIPPED | OUTPATIENT
Start: 2025-04-17 | End: 2025-05-17

## 2025-04-17 NOTE — TELEPHONE ENCOUNTER
----- Message from Darlene Machado DO sent at 9/18/2024 12:02 PM CDT -----  Letter- BS and trig mildly elevated, low sugar diet   Approve/deny? Last filled 2/17/25, last ov 12/17/24,   Pt aware needs appt

## 2025-05-23 DIAGNOSIS — E66.9 OBESITY (BMI 30-39.9): ICD-10-CM

## 2025-05-23 RX ORDER — TIRZEPATIDE 5 MG/.5ML
5 INJECTION, SOLUTION SUBCUTANEOUS WEEKLY
Qty: 2 ML | Refills: 2 | Status: SHIPPED | OUTPATIENT
Start: 2025-05-23

## 2025-05-23 NOTE — TELEPHONE ENCOUNTER
Requesting   Requested Prescriptions     Pending Prescriptions Disp Refills    Tirzepatide-Weight Management (ZEPBOUND) 5 MG/0.5ML Subcutaneous Solution Auto-injector 2 mL 2     Sig: Inject 5 mg into the skin once a week.       LOV: 4/07/2025  RTC: 9/25/2025  Last Relevant Labs: na  Filled: 2/26/2025 #2 ml with 2 refills    Future Appointments   Date Time Provider Department Center   6/17/2025  3:45 PM Fiordaliza Alfaro DO [facility] [address]   9/25/2025 12:20 PM Elsa Quintanilla APRN [id number]   2/6/2026  1:20 PM Elsa Quintanilla APRN [facility]

## 2025-05-27 ENCOUNTER — PATIENT MESSAGE (OUTPATIENT)
Dept: FAMILY MEDICINE CLINIC | Facility: CLINIC | Age: 35
End: 2025-05-27

## 2025-05-27 DIAGNOSIS — F90.2 ATTENTION DEFICIT HYPERACTIVITY DISORDER (ADHD), COMBINED TYPE: ICD-10-CM

## 2025-05-27 RX ORDER — DEXTROAMPHETAMINE SACCHARATE, AMPHETAMINE ASPARTATE, DEXTROAMPHETAMINE SULFATE AND AMPHETAMINE SULFATE 2.5; 2.5; 2.5; 2.5 MG/1; MG/1; MG/1; MG/1
10 TABLET ORAL DAILY
Qty: 30 TABLET | Refills: 0 | Status: SHIPPED | OUTPATIENT
Start: 2025-05-27 | End: 2025-06-26

## 2025-08-26 DIAGNOSIS — E66.9 OBESITY (BMI 30-39.9): ICD-10-CM

## 2025-08-27 RX ORDER — TIRZEPATIDE 5 MG/.5ML
5 INJECTION, SOLUTION SUBCUTANEOUS WEEKLY
Qty: 2 ML | Refills: 3 | Status: SHIPPED | OUTPATIENT
Start: 2025-08-27

## (undated) DIAGNOSIS — E66.3 OVERWEIGHT (BMI 25.0-29.9): ICD-10-CM

## (undated) DEVICE — ENDOSCOPY PACK - LOWER: Brand: MEDLINE INDUSTRIES, INC.

## (undated) DEVICE — 3M™ RED DOT™ MONITORING ELECTRODE WITH FOAM TAPE AND STICKY GEL, 50/BAG, 20/CASE, 72/PLT 2570: Brand: RED DOT™

## (undated) DEVICE — BALLOON HEMOSTATIC EUS LINEAR

## (undated) DEVICE — 1200CC GUARDIAN II: Brand: GUARDIAN

## (undated) DEVICE — FILTERLINE NASAL ADULT O2/CO2

## (undated) DEVICE — ENDOSCOPY PACK UPPER: Brand: MEDLINE INDUSTRIES, INC.

## (undated) DEVICE — 10FT COMBINED O2 DELIVERY/CO2 MONITORING. FILTER WITH MICROSTREAM TYPE LUER: Brand: DUAL ADULT NASAL CANNULA

## (undated) NOTE — IP AVS SNAPSHOT
[facility] [place] [address] [place], [address] ~ [phone]                Discharge Summary   5/30/2017    Alrene Chin           Admission Information        Provider Department    5/30/2017 DO Markus Evans 1sw-J      Why y Take 1 tablet (40 mg total) by mouth 2 (two) times daily. Cuate Archer     [    ]    [    ]    [    ]    [    ]            Where to Get Your Medications      These medications were sent to [facility]ACY #[id number] [address] [facility] [address] Immunization History as of 6/1/2017  Reviewed on 5/31/2017    HEP A 12/31/2015, 5/14/2015      OB Lab Results  (Last 3 results in the past 270 days)    Blood Type Rh Factor Rubella GBBS    (05/30/17)  B (05/30/17)  Positive (12/09/16)  Positive --    (12/0 Epidural Information Resolved   Labor Activity Resolved   Review Plan of Care Resolved   Pain Management Resolved   Fetal Monitoring Resolved   Delivery Process Resolved   Tocolytics Resolved   Antibiotics Resolved   Psychosocial/Spiritual Support Resolved view more details from this visit by going to [email or web address]. If you've recently had a stay at the Hospital you can access your discharge instructions in [facility] by going to Visits < Admission Summaries.  If you've been to the Emergency Depar

## (undated) NOTE — LETTER
Patient Name: Sammy Dhaliwal  YOB: 1990          MRN number:  [id number]  Date:  5/31/2018  Referring Physician:  Zohaib Rodarte    Discharge Summary  Initial Functional Outcome Score 46/100  Final Functional Outcome Score 66/100  Number of Visit at Dept: [phone].     Sincerely,  Electronically signed by therapist: Virgie Lemon, PT

## (undated) NOTE — MR AVS SNAPSHOT
[address] [address] [phone]               Thank you for choosing us for your health care visit with Lars Arevalo MD.  We are glad to serve you and happy to provide you with this summary of your visi If you've recently had a stay at the Hospital you can access your discharge instructions in whoplusyou by going to Visits < Admission Summaries.  If you've been to the Emergency Department or your doctor's office, you can view your past visit information in My Visit [facility] online at  [email or web address]

## (undated) NOTE — Clinical Note
01/26/2017    Dear Dr. Dino Farris      Thank you for referring your patient, Sarah Quispe to me for an evaluation. Please see my initial consult note enclosed below. Let me know if you have any questions.     Thank you  Rey Perry MD, Neurology  E does not usually help; no focal symptoms with migraine headaches. .       In the past, she had chronic daily headaches and had been on Topamax at 50 mg nightly, with some improvement in headaches but had some hair loss.   She stopped taking over a year ago a • Acne vulgaris    • Missed  11/10/2015         Past Surgical History    HAND/FINGER SURGERY UNLISTED Right 2010    Comment [facility] ALVA & JACQUE  2015       Smoking Status: Never Smoker                      Smokeless Status: Never Used EXTREMITIES: no cyanosis, peripheral pulses intact    Neck: Supple; full range of motion; no carotid bruits    Mental status:  Alert and oriented to time, place, person, and situation  Speech: fluent  Language: normal naming, repetition, and comprehension Patient history appears consistent with chronic daily headaches, but was likely previously undiagnosed chronic migraine without aura.   Patient has never been tried on any specific migraine abortive medications (i.e. triptan therapy), but has been on preven (R51) Chronic daily headache  (primary encounter diagnosis)  Plan: Verapamil HCl 40 MG Oral Tab        As noted above     (G43.019) Intractable migraine without aura and without status migrainosus  Plan: SUMAtriptan Succinate 50 MG Oral Tab, Verapamil

## (undated) NOTE — MR AVS SNAPSHOT
[address] [facility] [address] [phone]               Thank you for choosing us for your health care visit with Millicnet Khan MD.  We are glad to serve you and happy to provide you with this summary of your visi appointment. ? To best provide you care, patients receiving routine medications need to be seen at least once a year.  protocol for controlled substances:  Written prescriptions    ? Written prescriptions must be picked up in office. ?  Please a Return in about 3 months (around 4/26/2017).       Your Appointments     Feb 03, 2017  4:00 PM   Ancillary with [facility] TECH   [facility] [place] [address] ([address] [address] discharge instructions in [facility] by going to Visits < Admission Summaries. If you've been to the Emergency Department or your doctor's office, you can view your past visit information in [facility] by going to Visits < Visit Summaries. [facility] questions?

## (undated) NOTE — MR AVS SNAPSHOT
[facility]  [address] [facility] [facility] [address]  [phone]               Thank you for choosing us for your health care visit with Tali Ridley.   We are glad to serve you and happy to provide you with this summary to continue to breastfeed on demand minimum 8-12X/day, monitor for minimum 6-8 wet and 3-4 stools/day. Bottle feed and pump if too sore to breastfeed. Possible Pediatric consult to r/o tongue tie/upper lip tie. F/U with Lactation.     Breastfeeding Suggesti · Breast becomes firm, reddened, or painful. · Fever, chills, or flu-like symptoms. Check your temperature 3 times daily until a plugged duct or mastitis resolves.     If mastitis occurs:  · Continue breastfeeding and/or pumping - your milk is not infecte discharge instructions in [employer] by going to Visits < Admission Summaries. If you've been to the Emergency Department or your doctor's office, you can view your past visit information in [employer] by going to Visits < Visit Summaries. [employer] questions?

## (undated) NOTE — ED AVS SNAPSHOT
Karthik Valdez   MRN: [id number]    Department:  [facility] Emergency Department in [place]   Date of Visit:  2/16/2018           Disclosure     Insurance plans vary and the physician(s) referred by the ER may not be covered by your plan.  Please contact tell this physician (or your personal doctor if your instructions are to return to your personal doctor) about any new or lasting problems. The primary care or specialist physician will see patients referred from the [facility] Emergency Department.  Severo Pastures

## (undated) NOTE — LETTER
12/12/19        [address] [employer] [address]      Dear Brianna Douglass,    [facility] records indicate that you have outstanding lab work and or testing that was ordered for you and has not yet been completed:  Orders Placed This Encounter      T4 F

## (undated) NOTE — LETTER
Patient Name: Paz Martínez  YOB: 1990          MRN number:  [id number]  Date:  8/23/2021  Referring Physician:  Jeana Joyce         SPINE EVALUATION:    Referring Physician: Dr. La Gipson  Diagnosis: Lumbar Instability;  Right Shoulder Strain benefit from skilled Physical Therapy to address the above impairments to increase functional mobility and restore the patient to sustained posturing and exercise. Precautions:  None  OBJECTIVE:   Observation/Posture:  The patient presents with a struc evaluation involved Moderate Complexity decision making due to 3+ personal factors/comorbidities. PLAN OF CARE:    Goals (12 visits):    1. Increase FOTO assessment > 11% from INE to DC.   2. Patient will be aware of postural limitations and be able to c

## (undated) NOTE — LETTER
[address]ak [address]   Date:   5/27/2021     Name:   Regine Blanchard    YOB: 1990   MRN:   [id number]       WHERE IS YOUR PAIN NOW? Bryant the areas on your body where you feel the described sensations.   Use the appropriate symbo